# Patient Record
Sex: FEMALE | Race: WHITE | NOT HISPANIC OR LATINO | Employment: OTHER | URBAN - METROPOLITAN AREA
[De-identification: names, ages, dates, MRNs, and addresses within clinical notes are randomized per-mention and may not be internally consistent; named-entity substitution may affect disease eponyms.]

---

## 2017-04-19 ENCOUNTER — HOSPITAL ENCOUNTER (OUTPATIENT)
Dept: OTHER | Facility: HOSPITAL | Age: 47
Setting detail: SPECIMEN
Discharge: HOME | End: 2017-04-19
Attending: NURSE PRACTITIONER | Admitting: NURSE PRACTITIONER

## 2017-04-19 LAB
ALBUMIN SERPL-MCNC: 3.9 G/DL (ref 3.5–4.8)
ALBUMIN/GLOB SERPL: 1.3 {RATIO} (ref 1–1.7)
ALP SERPL-CCNC: 60 IU/L (ref 32–91)
ALT SERPL-CCNC: 19 IU/L (ref 14–54)
ANION GAP SERPL CALC-SCNC: 13.3 MMOL/L (ref 10–20)
AST SERPL-CCNC: 22 IU/L (ref 15–41)
BILIRUB SERPL-MCNC: 0.7 MG/DL (ref 0.3–1.2)
BUN SERPL-MCNC: 12 MG/DL (ref 8–20)
BUN/CREAT SERPL: 17.1 (ref 5.4–26.2)
CALCIUM SERPL-MCNC: 9.2 MG/DL (ref 8.9–10.3)
CHLORIDE SERPL-SCNC: 109 MMOL/L (ref 101–111)
CHOLEST SERPL-MCNC: 174 MG/DL
CHOLEST/HDLC SERPL: 4.1 {RATIO}
CONV CO2: 25 MMOL/L (ref 22–32)
CONV LDL CHOLESTEROL DIRECT: 112 MG/DL (ref 0–100)
CONV TOTAL PROTEIN: 6.9 G/DL (ref 6.1–7.9)
CREAT UR-MCNC: 0.7 MG/DL (ref 0.4–1)
GLOBULIN UR ELPH-MCNC: 3 G/DL (ref 2.5–3.8)
GLUCOSE SERPL-MCNC: 109 MG/DL (ref 65–99)
HDLC SERPL-MCNC: 43 MG/DL
LDLC/HDLC SERPL: 2.6 {RATIO}
LIPID INTERPRETATION: ABNORMAL
POTASSIUM SERPL-SCNC: 4.3 MMOL/L (ref 3.6–5.1)
SODIUM SERPL-SCNC: 143 MMOL/L (ref 136–144)
TRIGL SERPL-MCNC: 110 MG/DL
VLDLC SERPL CALC-MCNC: 19.7 MG/DL

## 2017-07-11 ENCOUNTER — HOSPITAL ENCOUNTER (OUTPATIENT)
Dept: OTHER | Facility: HOSPITAL | Age: 47
Setting detail: SPECIMEN
Discharge: HOME | End: 2017-07-11
Attending: NURSE PRACTITIONER | Admitting: NURSE PRACTITIONER

## 2017-07-11 ENCOUNTER — HOSPITAL ENCOUNTER (OUTPATIENT)
Dept: CT IMAGING | Facility: HOSPITAL | Age: 47
Discharge: HOME | End: 2017-07-11
Attending: NURSE PRACTITIONER | Admitting: NURSE PRACTITIONER

## 2017-07-11 LAB
BACTERIA SPEC AEROBE CULT: NORMAL
Lab: NORMAL
MICRO REPORT STATUS: NORMAL
SPECIMEN SOURCE: NORMAL

## 2018-04-23 ENCOUNTER — HOSPITAL ENCOUNTER (OUTPATIENT)
Dept: OTHER | Facility: HOSPITAL | Age: 48
Setting detail: SPECIMEN
Discharge: HOME | End: 2018-04-23
Attending: NURSE PRACTITIONER | Admitting: NURSE PRACTITIONER

## 2018-04-23 LAB
ALBUMIN SERPL-MCNC: 3.8 G/DL (ref 3.5–4.8)
ALBUMIN/GLOB SERPL: 1.2 {RATIO} (ref 1–1.7)
ALP SERPL-CCNC: 79 IU/L (ref 32–91)
ALT SERPL-CCNC: 15 IU/L (ref 14–54)
ANION GAP SERPL CALC-SCNC: 12.3 MMOL/L (ref 10–20)
AST SERPL-CCNC: 18 IU/L (ref 15–41)
BACTERIA SPEC AEROBE CULT: NORMAL
BASOPHILS # BLD AUTO: 0.1 10*3/UL (ref 0–0.2)
BASOPHILS NFR BLD AUTO: 1 % (ref 0–2)
BILIRUB SERPL-MCNC: 0.6 MG/DL (ref 0.3–1.2)
BUN SERPL-MCNC: 8 MG/DL (ref 8–20)
BUN/CREAT SERPL: 11.4 (ref 5.4–26.2)
CALCIUM SERPL-MCNC: 9.2 MG/DL (ref 8.9–10.3)
CHLORIDE SERPL-SCNC: 107 MMOL/L (ref 101–111)
CHOLEST SERPL-MCNC: 206 MG/DL
CHOLEST/HDLC SERPL: 4.1 {RATIO}
CONV CO2: 25 MMOL/L (ref 22–32)
CONV LDL CHOLESTEROL DIRECT: 128 MG/DL (ref 0–100)
CONV TOTAL PROTEIN: 7 G/DL (ref 6.1–7.9)
CREAT UR-MCNC: 0.7 MG/DL (ref 0.4–1)
DIFFERENTIAL METHOD BLD: (no result)
EOSINOPHIL # BLD AUTO: 0.2 10*3/UL (ref 0–0.3)
EOSINOPHIL # BLD AUTO: 3 % (ref 0–3)
ERYTHROCYTE [DISTWIDTH] IN BLOOD BY AUTOMATED COUNT: 13.5 % (ref 11.5–14.5)
GLOBULIN UR ELPH-MCNC: 3.2 G/DL (ref 2.5–3.8)
GLUCOSE SERPL-MCNC: 110 MG/DL (ref 65–99)
HCT VFR BLD AUTO: 43.6 % (ref 35–49)
HDLC SERPL-MCNC: 51 MG/DL
HGB BLD-MCNC: 15.3 G/DL (ref 12–15)
LDLC/HDLC SERPL: 2.5 {RATIO}
LIPID INTERPRETATION: ABNORMAL
LYMPHOCYTES # BLD AUTO: 2.5 10*3/UL (ref 0.8–4.8)
LYMPHOCYTES NFR BLD AUTO: 31 % (ref 18–42)
Lab: NORMAL
MCH RBC QN AUTO: 32.5 PG (ref 26–32)
MCHC RBC AUTO-ENTMCNC: 35 G/DL (ref 32–36)
MCV RBC AUTO: 92.9 FL (ref 80–94)
MICRO REPORT STATUS: NORMAL
MONOCYTES # BLD AUTO: 0.4 10*3/UL (ref 0.1–1.3)
MONOCYTES NFR BLD AUTO: 5 % (ref 2–11)
NEUTROPHILS # BLD AUTO: 4.8 10*3/UL (ref 2.3–8.6)
NEUTROPHILS NFR BLD AUTO: 60 % (ref 50–75)
NRBC BLD AUTO-RTO: 0 /100{WBCS}
NRBC/RBC NFR BLD MANUAL: 0 10*3/UL
PLATELET # BLD AUTO: 234 10*3/UL (ref 150–450)
PMV BLD AUTO: 9.9 FL (ref 7.4–10.4)
POTASSIUM SERPL-SCNC: 4.3 MMOL/L (ref 3.6–5.1)
RBC # BLD AUTO: 4.69 10*6/UL (ref 4–5.4)
SODIUM SERPL-SCNC: 140 MMOL/L (ref 136–144)
SPECIMEN SOURCE: NORMAL
TRIGL SERPL-MCNC: 140 MG/DL
VLDLC SERPL CALC-MCNC: 27.2 MG/DL
WBC # BLD AUTO: 8 10*3/UL (ref 4.5–11.5)

## 2018-11-13 ENCOUNTER — HOSPITAL ENCOUNTER (OUTPATIENT)
Dept: OTHER | Facility: HOSPITAL | Age: 48
Setting detail: SPECIMEN
Discharge: HOME | End: 2018-11-13
Attending: NURSE PRACTITIONER | Admitting: NURSE PRACTITIONER

## 2018-11-13 LAB
BACTERIA SPEC AEROBE CULT: NORMAL
Lab: NORMAL
MICRO REPORT STATUS: NORMAL
SPECIMEN SOURCE: NORMAL

## 2019-02-06 ENCOUNTER — HOSPITAL ENCOUNTER (OUTPATIENT)
Dept: OTHER | Facility: HOSPITAL | Age: 49
Setting detail: SPECIMEN
Discharge: HOME | End: 2019-02-06
Attending: NURSE PRACTITIONER | Admitting: NURSE PRACTITIONER

## 2019-02-06 LAB
ALBUMIN SERPL-MCNC: 4 G/DL (ref 3.5–4.8)
ALBUMIN/GLOB SERPL: 1.2 {RATIO} (ref 1–1.7)
ALP SERPL-CCNC: 85 IU/L (ref 32–91)
ALT SERPL-CCNC: 20 IU/L (ref 14–54)
ANION GAP SERPL CALC-SCNC: 13.9 MMOL/L (ref 10–20)
AST SERPL-CCNC: 24 IU/L (ref 15–41)
BASOPHILS # BLD AUTO: 0.1 10*3/UL (ref 0–0.2)
BASOPHILS NFR BLD AUTO: 1 % (ref 0–2)
BILIRUB SERPL-MCNC: 0.3 MG/DL (ref 0.3–1.2)
BUN SERPL-MCNC: 13 MG/DL (ref 8–20)
BUN/CREAT SERPL: 16.3 (ref 5.4–26.2)
CALCIUM SERPL-MCNC: 9.3 MG/DL (ref 8.9–10.3)
CHLORIDE SERPL-SCNC: 107 MMOL/L (ref 101–111)
CHOLEST SERPL-MCNC: 225 MG/DL
CHOLEST/HDLC SERPL: 4 {RATIO}
CONV CO2: 23 MMOL/L (ref 22–32)
CONV LDL CHOLESTEROL DIRECT: 158 MG/DL (ref 0–100)
CONV TOTAL PROTEIN: 7.4 G/DL (ref 6.1–7.9)
CREAT UR-MCNC: 0.8 MG/DL (ref 0.4–1)
DIFFERENTIAL METHOD BLD: (no result)
EOSINOPHIL # BLD AUTO: 0.3 10*3/UL (ref 0–0.3)
EOSINOPHIL # BLD AUTO: 4 % (ref 0–3)
ERYTHROCYTE [DISTWIDTH] IN BLOOD BY AUTOMATED COUNT: 13.6 % (ref 11.5–14.5)
GLOBULIN UR ELPH-MCNC: 3.4 G/DL (ref 2.5–3.8)
GLUCOSE SERPL-MCNC: 113 MG/DL (ref 65–99)
HCT VFR BLD AUTO: 46.1 % (ref 35–49)
HDLC SERPL-MCNC: 56 MG/DL
HGB BLD-MCNC: 15.9 G/DL (ref 12–15)
LDLC/HDLC SERPL: 2.8 {RATIO}
LIPID INTERPRETATION: ABNORMAL
LYMPHOCYTES # BLD AUTO: 2.8 10*3/UL (ref 0.8–4.8)
LYMPHOCYTES NFR BLD AUTO: 37 % (ref 18–42)
MCH RBC QN AUTO: 32.9 PG (ref 26–32)
MCHC RBC AUTO-ENTMCNC: 34.6 G/DL (ref 32–36)
MCV RBC AUTO: 95.2 FL (ref 80–94)
MONOCYTES # BLD AUTO: 0.5 10*3/UL (ref 0.1–1.3)
MONOCYTES NFR BLD AUTO: 6 % (ref 2–11)
NEUTROPHILS # BLD AUTO: 3.9 10*3/UL (ref 2.3–8.6)
NEUTROPHILS NFR BLD AUTO: 52 % (ref 50–75)
NRBC BLD AUTO-RTO: 0 /100{WBCS}
NRBC/RBC NFR BLD MANUAL: 0 10*3/UL
PLATELET # BLD AUTO: 289 10*3/UL (ref 150–450)
PMV BLD AUTO: 10 FL (ref 7.4–10.4)
POTASSIUM SERPL-SCNC: 3.9 MMOL/L (ref 3.6–5.1)
RBC # BLD AUTO: 4.84 10*6/UL (ref 4–5.4)
SODIUM SERPL-SCNC: 140 MMOL/L (ref 136–144)
TRIGL SERPL-MCNC: 109 MG/DL
VLDLC SERPL CALC-MCNC: 10.8 MG/DL
WBC # BLD AUTO: 7.5 10*3/UL (ref 4.5–11.5)

## 2019-02-13 ENCOUNTER — HOSPITAL ENCOUNTER (OUTPATIENT)
Dept: CT IMAGING | Facility: HOSPITAL | Age: 49
Discharge: HOME | End: 2019-02-13
Attending: NURSE PRACTITIONER | Admitting: NURSE PRACTITIONER

## 2019-07-22 RX ORDER — LORAZEPAM 0.5 MG/1
0.5 TABLET ORAL 2 TIMES DAILY
Qty: 60 TABLET | Refills: 0 | Status: SHIPPED | OUTPATIENT
Start: 2019-07-22 | End: 2019-09-10 | Stop reason: SDUPTHER

## 2019-07-22 RX ORDER — SERTRALINE HYDROCHLORIDE 25 MG/1
25 TABLET, FILM COATED ORAL DAILY
Qty: 90 TABLET | Refills: 0 | Status: SHIPPED | OUTPATIENT
Start: 2019-07-22 | End: 2019-09-25 | Stop reason: SDUPTHER

## 2019-09-10 RX ORDER — CHOLECALCIFEROL (VITAMIN D3) 25 MCG
1 TABLET,CHEWABLE ORAL DAILY
Qty: 90 CAPSULE | Refills: 0 | OUTPATIENT
Start: 2019-09-10

## 2019-09-10 RX ORDER — ALBUTEROL SULFATE 90 UG/1
2 AEROSOL, METERED RESPIRATORY (INHALATION) EVERY 4 HOURS PRN
Qty: 1 INHALER | Refills: 0 | Status: SHIPPED | OUTPATIENT
Start: 2019-09-10 | End: 2019-10-03 | Stop reason: SDUPTHER

## 2019-09-10 RX ORDER — LORAZEPAM 0.5 MG/1
0.5 TABLET ORAL 2 TIMES DAILY
Qty: 60 TABLET | Refills: 0 | Status: SHIPPED | OUTPATIENT
Start: 2019-09-10 | End: 2019-10-28 | Stop reason: SDUPTHER

## 2019-09-25 ENCOUNTER — OFFICE VISIT (OUTPATIENT)
Dept: FAMILY MEDICINE CLINIC | Facility: CLINIC | Age: 49
End: 2019-09-25

## 2019-09-25 VITALS
HEIGHT: 65 IN | HEART RATE: 76 BPM | DIASTOLIC BLOOD PRESSURE: 88 MMHG | TEMPERATURE: 97.9 F | WEIGHT: 212 LBS | OXYGEN SATURATION: 94 % | SYSTOLIC BLOOD PRESSURE: 131 MMHG | BODY MASS INDEX: 35.32 KG/M2

## 2019-09-25 DIAGNOSIS — E78.2 MIXED HYPERLIPIDEMIA: ICD-10-CM

## 2019-09-25 DIAGNOSIS — Z12.31 SCREENING MAMMOGRAM, ENCOUNTER FOR: ICD-10-CM

## 2019-09-25 DIAGNOSIS — D75.89 MACROCYTOSIS: ICD-10-CM

## 2019-09-25 DIAGNOSIS — R73.9 HYPERGLYCEMIA: Primary | ICD-10-CM

## 2019-09-25 DIAGNOSIS — E55.9 VITAMIN D DEFICIENCY: ICD-10-CM

## 2019-09-25 DIAGNOSIS — F41.9 ANXIETY: ICD-10-CM

## 2019-09-25 DIAGNOSIS — E03.9 HYPOTHYROIDISM, UNSPECIFIED TYPE: ICD-10-CM

## 2019-09-25 PROBLEM — R91.1 LUNG NODULE: Status: ACTIVE | Noted: 2018-01-03

## 2019-09-25 LAB
ALBUMIN SERPL-MCNC: 4.2 G/DL (ref 3.5–4.8)
ALBUMIN/GLOB SERPL: 1.5 G/DL (ref 1–1.7)
ALP SERPL-CCNC: 72 U/L (ref 32–91)
ALT SERPL W P-5'-P-CCNC: 16 U/L (ref 14–54)
ANION GAP SERPL CALCULATED.3IONS-SCNC: 15.7 MMOL/L (ref 5–15)
ARTICHOKE IGE QN: 182 MG/DL (ref 0–100)
AST SERPL-CCNC: 20 U/L (ref 15–41)
BASOPHILS # BLD AUTO: 0.1 10*3/MM3 (ref 0–0.2)
BASOPHILS NFR BLD AUTO: 1.1 % (ref 0–1.5)
BILIRUB SERPL-MCNC: 0.4 MG/DL (ref 0.3–1.2)
BUN BLD-MCNC: 16 MG/DL (ref 8–20)
BUN/CREAT SERPL: 20 (ref 5.4–26.2)
CALCIUM SPEC-SCNC: 9.7 MG/DL (ref 8.9–10.3)
CHLORIDE SERPL-SCNC: 107 MMOL/L (ref 101–111)
CHOLEST SERPL-MCNC: 236 MG/DL
CO2 SERPL-SCNC: 24 MMOL/L (ref 22–32)
CREAT BLD-MCNC: 0.8 MG/DL (ref 0.4–1)
DEPRECATED RDW RBC AUTO: 45.1 FL (ref 37–54)
EOSINOPHIL # BLD AUTO: 0.2 10*3/MM3 (ref 0–0.4)
EOSINOPHIL NFR BLD AUTO: 2.9 % (ref 0.3–6.2)
ERYTHROCYTE [DISTWIDTH] IN BLOOD BY AUTOMATED COUNT: 13.5 % (ref 12.3–15.4)
GFR SERPL CREATININE-BSD FRML MDRD: 76 ML/MIN/1.73
GLOBULIN UR ELPH-MCNC: 2.8 GM/DL (ref 2.5–3.8)
GLUCOSE BLD-MCNC: 104 MG/DL (ref 65–99)
HCT VFR BLD AUTO: 46.7 % (ref 34–46.6)
HDLC SERPL QL: 3.87
HDLC SERPL-MCNC: 61 MG/DL
HGB BLD-MCNC: 16.3 G/DL (ref 12–15.9)
LDLC/HDLC SERPL: 2.55 {RATIO}
LYMPHOCYTES # BLD AUTO: 2.6 10*3/MM3 (ref 0.7–3.1)
LYMPHOCYTES NFR BLD AUTO: 34.7 % (ref 19.6–45.3)
MCH RBC QN AUTO: 33.4 PG (ref 26.6–33)
MCHC RBC AUTO-ENTMCNC: 34.9 G/DL (ref 31.5–35.7)
MCV RBC AUTO: 95.7 FL (ref 79–97)
MONOCYTES # BLD AUTO: 0.5 10*3/MM3 (ref 0.1–0.9)
MONOCYTES NFR BLD AUTO: 6.9 % (ref 5–12)
NEUTROPHILS # BLD AUTO: 4 10*3/MM3 (ref 1.7–7)
NEUTROPHILS NFR BLD AUTO: 54.4 % (ref 42.7–76)
NRBC BLD AUTO-RTO: 0.3 /100 WBC (ref 0–0.2)
PLATELET # BLD AUTO: 275 10*3/MM3 (ref 140–450)
PMV BLD AUTO: 9.1 FL (ref 6–12)
POTASSIUM BLD-SCNC: 4.7 MMOL/L (ref 3.6–5.1)
PROT SERPL-MCNC: 7 G/DL (ref 6.1–7.9)
RBC # BLD AUTO: 4.88 10*6/MM3 (ref 3.77–5.28)
SODIUM BLD-SCNC: 142 MMOL/L (ref 136–144)
T4 FREE SERPL-MCNC: 0.74 NG/DL (ref 0.58–1.64)
TRIGL SERPL-MCNC: 98 MG/DL
TSH SERPL DL<=0.05 MIU/L-ACNC: 6.68 UIU/ML (ref 0.34–5.6)
VIT B12 BLD-MCNC: 670 PG/ML (ref 180–914)
VLDLC SERPL-MCNC: 19.6 MG/DL
WBC NRBC COR # BLD: 7.4 10*3/MM3 (ref 3.4–10.8)

## 2019-09-25 PROCEDURE — 82607 VITAMIN B-12: CPT | Performed by: NURSE PRACTITIONER

## 2019-09-25 PROCEDURE — 84443 ASSAY THYROID STIM HORMONE: CPT | Performed by: NURSE PRACTITIONER

## 2019-09-25 PROCEDURE — 84439 ASSAY OF FREE THYROXINE: CPT | Performed by: NURSE PRACTITIONER

## 2019-09-25 PROCEDURE — 80061 LIPID PANEL: CPT | Performed by: NURSE PRACTITIONER

## 2019-09-25 PROCEDURE — 83036 HEMOGLOBIN GLYCOSYLATED A1C: CPT | Performed by: NURSE PRACTITIONER

## 2019-09-25 PROCEDURE — 85025 COMPLETE CBC W/AUTO DIFF WBC: CPT | Performed by: NURSE PRACTITIONER

## 2019-09-25 PROCEDURE — 82306 VITAMIN D 25 HYDROXY: CPT | Performed by: NURSE PRACTITIONER

## 2019-09-25 PROCEDURE — 80053 COMPREHEN METABOLIC PANEL: CPT | Performed by: NURSE PRACTITIONER

## 2019-09-25 PROCEDURE — 99214 OFFICE O/P EST MOD 30 MIN: CPT | Performed by: NURSE PRACTITIONER

## 2019-09-25 RX ORDER — METHYLPREDNISOLONE 4 MG/1
TABLET ORAL
Qty: 21 TABLET | Refills: 0 | Status: SHIPPED | OUTPATIENT
Start: 2019-09-25 | End: 2020-02-27

## 2019-09-25 RX ORDER — LEVOTHYROXINE SODIUM 0.05 MG/1
25 TABLET ORAL DAILY
Qty: 90 TABLET | Refills: 0 | Status: SHIPPED | OUTPATIENT
Start: 2019-09-25 | End: 2019-09-25 | Stop reason: SDUPTHER

## 2019-09-25 RX ORDER — LEVOTHYROXINE SODIUM 0.03 MG/1
25 TABLET ORAL DAILY
Qty: 90 TABLET | Refills: 0 | Status: SHIPPED | OUTPATIENT
Start: 2019-09-25 | End: 2020-02-06

## 2019-09-25 NOTE — PROGRESS NOTES
Subjective   Romina Camarillo is a 48 y.o. female.     Patient presents today for follow up on anxiety, hyperlipidemia, and hypothyroidism. She notes cough and congestion this week.          The following portions of the patient's history were reviewed and updated as appropriate: allergies, current medications, past family history, past medical history, past social history, past surgical history and problem list.    Review of Systems   Constitutional: Negative for activity change, chills, diaphoresis, fatigue and fever.   HENT: Positive for rhinorrhea. Negative for congestion, ear pain, facial swelling, mouth sores, sinus pressure and sore throat.    Eyes: Negative for blurred vision, double vision, photophobia, pain and visual disturbance.   Respiratory: Positive for cough and chest tightness. Negative for choking, shortness of breath, wheezing and stridor.    Cardiovascular: Negative for chest pain, palpitations and leg swelling.   Gastrointestinal: Negative for abdominal distention, abdominal pain, anal bleeding, blood in stool, constipation, diarrhea, nausea, rectal pain, vomiting, GERD and indigestion.   Endocrine: Negative for polydipsia, polyphagia and polyuria.   Genitourinary: Negative for difficulty urinating, frequency, hematuria, urgency and urinary incontinence.   Musculoskeletal: Negative for arthralgias, back pain and neck pain.   Skin: Negative for rash and skin lesions.   Neurological: Negative for dizziness, tremors, weakness, light-headedness and headache.   Psychiatric/Behavioral: Negative for agitation, behavioral problems, self-injury, sleep disturbance, suicidal ideas, depressed mood and stress. The patient is nervous/anxious.        Objective   Physical Exam   Constitutional: She is oriented to person, place, and time. She appears well-developed and well-nourished. No distress.   HENT:   Head: Normocephalic and atraumatic.   Right Ear: External ear normal.   Left Ear: External ear normal.    Nose: Nose normal.   Mouth/Throat: Oropharynx is clear and moist. No oropharyngeal exudate.   Eyes: Conjunctivae and EOM are normal. Pupils are equal, round, and reactive to light. Right eye exhibits no discharge. Left eye exhibits no discharge. No scleral icterus.   Neck: Normal range of motion. Neck supple. No JVD present. Carotid bruit is not present. No tracheal deviation present. No thyromegaly present.   Cardiovascular: Normal rate, regular rhythm, normal heart sounds and intact distal pulses. Exam reveals no gallop and no friction rub.   No murmur heard.  Pulmonary/Chest: No stridor. No respiratory distress. She has wheezes. She has no rales. She exhibits no tenderness.   Abdominal: Soft. Bowel sounds are normal. She exhibits no distension. There is no tenderness.   Musculoskeletal: Normal range of motion. She exhibits no edema, tenderness or deformity.   Lymphadenopathy:     She has no cervical adenopathy.   Neurological: She is alert and oriented to person, place, and time. No sensory deficit. She exhibits normal muscle tone. Coordination normal.   Skin: Skin is warm and dry. Capillary refill takes less than 2 seconds. No rash noted. She is not diaphoretic.   Psychiatric: She has a normal mood and affect. Her behavior is normal. Judgment and thought content normal.         Assessment/Plan   Romina was seen today for cough, nasal congestion and follow-up.    Diagnoses and all orders for this visit:    Hyperglycemia  -     Comprehensive Metabolic Panel  -     Hemoglobin A1c    Macrocytosis  -     CBC & Differential  -     Vitamin B12  -     CBC Auto Differential    Vitamin D deficiency  -     Vitamin D 25 Hydroxy    Mixed hyperlipidemia  -     Lipid Panel    Hypothyroidism, unspecified type  -     TSH  -     T4, Free    Screening mammogram, encounter for  -     Mammo Screening Digital Tomosynthesis Bilateral With CAD    Anxiety  -     Ambulatory Referral to Psychiatry  -     sertraline (ZOLOFT) 50 MG  tablet; Take 1 tablet by mouth Daily.  - Increase Zoloft to 50 mg as patient has continued anxiety    Wheezing  -     methylPREDNISolone (MEDROL, HAZEL,) 4 MG tablet; Take as directed on package instructions.  - Sned medrol pack, follow up if no improvement. Continue rescue inhaler    Preventative Health Care   -  Order mammogram today   -  Order cologuard. Patient declines colonoscopy   -  Followed by OBGYN   -  Declines flu shot

## 2019-09-26 LAB
25(OH)D3 SERPL-MCNC: 24.8 NG/ML (ref 30–100)
HBA1C MFR BLD: 5.3 % (ref 3.5–5.6)

## 2019-09-27 RX ORDER — ERGOCALCIFEROL 1.25 MG/1
50000 CAPSULE ORAL WEEKLY
Qty: 5 CAPSULE | Refills: 2 | Status: SHIPPED | OUTPATIENT
Start: 2019-09-27 | End: 2020-02-27 | Stop reason: SDUPTHER

## 2019-09-27 NOTE — PROGRESS NOTES
Patient notified. She is not taking her thyroid or cholesterol meds at all. She says she will start. She Is out of Vit D could you send refill to her pharmacy?

## 2019-09-30 RX ORDER — CHOLECALCIFEROL (VITAMIN D3) 25 MCG
1 TABLET,CHEWABLE ORAL DAILY
Qty: 90 CAPSULE | Refills: 0 | Status: SHIPPED | OUTPATIENT
Start: 2019-09-30 | End: 2021-03-24 | Stop reason: SDUPTHER

## 2019-10-04 RX ORDER — ALBUTEROL SULFATE 90 UG/1
AEROSOL, METERED RESPIRATORY (INHALATION)
Qty: 18 G | Refills: 0 | Status: SHIPPED | OUTPATIENT
Start: 2019-10-04 | End: 2019-11-22 | Stop reason: SDUPTHER

## 2019-10-29 RX ORDER — LORAZEPAM 0.5 MG/1
TABLET ORAL
Qty: 60 TABLET | Refills: 0 | Status: SHIPPED | OUTPATIENT
Start: 2019-10-29 | End: 2019-12-30 | Stop reason: SDUPTHER

## 2019-11-22 RX ORDER — ALBUTEROL SULFATE 90 UG/1
AEROSOL, METERED RESPIRATORY (INHALATION)
Qty: 18 G | Refills: 0 | Status: SHIPPED | OUTPATIENT
Start: 2019-11-22 | End: 2019-12-27

## 2019-12-27 RX ORDER — ALBUTEROL SULFATE 90 UG/1
AEROSOL, METERED RESPIRATORY (INHALATION)
Qty: 18 G | Refills: 0 | Status: SHIPPED | OUTPATIENT
Start: 2019-12-27 | End: 2020-02-06

## 2019-12-30 DIAGNOSIS — F41.9 ANXIETY: Primary | ICD-10-CM

## 2019-12-31 RX ORDER — LORAZEPAM 0.5 MG/1
0.5 TABLET ORAL 2 TIMES DAILY
Qty: 60 TABLET | Refills: 0 | Status: SHIPPED | OUTPATIENT
Start: 2019-12-31 | End: 2020-02-06

## 2020-02-06 DIAGNOSIS — F41.9 ANXIETY: ICD-10-CM

## 2020-02-06 RX ORDER — LEVOTHYROXINE SODIUM 0.03 MG/1
TABLET ORAL
Qty: 30 TABLET | Refills: 0 | Status: SHIPPED | OUTPATIENT
Start: 2020-02-06 | End: 2020-02-27 | Stop reason: SDUPTHER

## 2020-02-06 RX ORDER — ALBUTEROL SULFATE 90 UG/1
AEROSOL, METERED RESPIRATORY (INHALATION)
Qty: 18 G | Refills: 0 | Status: SHIPPED | OUTPATIENT
Start: 2020-02-06 | End: 2020-03-06

## 2020-02-06 RX ORDER — LORAZEPAM 0.5 MG/1
TABLET ORAL
Qty: 60 TABLET | Refills: 0 | Status: SHIPPED | OUTPATIENT
Start: 2020-02-06 | End: 2020-03-24

## 2020-02-27 ENCOUNTER — OFFICE VISIT (OUTPATIENT)
Dept: FAMILY MEDICINE CLINIC | Facility: CLINIC | Age: 50
End: 2020-02-27

## 2020-02-27 VITALS
HEIGHT: 65 IN | DIASTOLIC BLOOD PRESSURE: 82 MMHG | BODY MASS INDEX: 36.22 KG/M2 | WEIGHT: 217.4 LBS | OXYGEN SATURATION: 99 % | SYSTOLIC BLOOD PRESSURE: 118 MMHG | HEART RATE: 61 BPM

## 2020-02-27 DIAGNOSIS — Z72.0 TOBACCO ABUSE: ICD-10-CM

## 2020-02-27 DIAGNOSIS — Z71.6 TOBACCO ABUSE COUNSELING: ICD-10-CM

## 2020-02-27 DIAGNOSIS — I10 ESSENTIAL HYPERTENSION: ICD-10-CM

## 2020-02-27 DIAGNOSIS — R91.1 LUNG NODULE: ICD-10-CM

## 2020-02-27 DIAGNOSIS — F41.9 ANXIETY: ICD-10-CM

## 2020-02-27 DIAGNOSIS — E03.9 ACQUIRED HYPOTHYROIDISM: Primary | ICD-10-CM

## 2020-02-27 PROCEDURE — 99214 OFFICE O/P EST MOD 30 MIN: CPT | Performed by: NURSE PRACTITIONER

## 2020-02-27 RX ORDER — ERGOCALCIFEROL 1.25 MG/1
50000 CAPSULE ORAL WEEKLY
Qty: 12 CAPSULE | Refills: 3 | Status: SHIPPED | OUTPATIENT
Start: 2020-02-27 | End: 2021-02-05 | Stop reason: SDUPTHER

## 2020-02-27 RX ORDER — LEVOTHYROXINE SODIUM 0.03 MG/1
25 TABLET ORAL DAILY
Qty: 90 TABLET | Refills: 0 | Status: SHIPPED | OUTPATIENT
Start: 2020-02-27 | End: 2020-05-13 | Stop reason: SDUPTHER

## 2020-02-27 RX ORDER — METHYLPREDNISOLONE 4 MG/1
TABLET ORAL
Qty: 21 TABLET | Refills: 0 | Status: SHIPPED | OUTPATIENT
Start: 2020-02-27 | End: 2020-05-12

## 2020-02-27 RX ORDER — AZITHROMYCIN 250 MG/1
TABLET, FILM COATED ORAL
Qty: 6 TABLET | Refills: 0 | Status: SHIPPED | OUTPATIENT
Start: 2020-02-27 | End: 2020-05-12

## 2020-03-05 ENCOUNTER — TELEPHONE (OUTPATIENT)
Dept: FAMILY MEDICINE CLINIC | Facility: CLINIC | Age: 50
End: 2020-03-05

## 2020-03-05 RX ORDER — AMOXICILLIN 875 MG/1
875 TABLET, COATED ORAL 2 TIMES DAILY
Qty: 20 TABLET | Refills: 0 | Status: SHIPPED | OUTPATIENT
Start: 2020-03-05 | End: 2020-03-15

## 2020-03-05 NOTE — TELEPHONE ENCOUNTER
I sent her in amoxicillin, recommend her to get Mucinex DM over-the-counter as well and start using Flonase to both nostrils twice daily.    Also from my last note: Can you please get ct chest from old chart, 2 lung nodules.  I will order ct chest when due    thank you

## 2020-03-05 NOTE — TELEPHONE ENCOUNTER
Pt said she has taken her meds and still ear hurts and is clogged didn't know if you wanted to prescribed more meds or what you recommended. Thanks obi

## 2020-03-06 DIAGNOSIS — R91.1 LEFT LOWER LOBE PULMONARY NODULE: Primary | ICD-10-CM

## 2020-03-06 RX ORDER — ALBUTEROL SULFATE 90 UG/1
AEROSOL, METERED RESPIRATORY (INHALATION)
Qty: 18 G | Refills: 0 | Status: SHIPPED | OUTPATIENT
Start: 2020-03-06 | End: 2020-04-23

## 2020-03-24 DIAGNOSIS — F41.9 ANXIETY: ICD-10-CM

## 2020-03-24 RX ORDER — LORAZEPAM 0.5 MG/1
TABLET ORAL
Qty: 60 TABLET | Refills: 0 | Status: SHIPPED | OUTPATIENT
Start: 2020-03-24 | End: 2020-04-23

## 2020-04-22 DIAGNOSIS — F41.9 ANXIETY: ICD-10-CM

## 2020-04-23 RX ORDER — ALBUTEROL SULFATE 90 UG/1
AEROSOL, METERED RESPIRATORY (INHALATION)
Qty: 18 G | Refills: 0 | Status: SHIPPED | OUTPATIENT
Start: 2020-04-23 | End: 2020-06-09

## 2020-04-23 RX ORDER — LORAZEPAM 0.5 MG/1
TABLET ORAL
Qty: 60 TABLET | Refills: 0 | Status: SHIPPED | OUTPATIENT
Start: 2020-04-23 | End: 2020-06-09

## 2020-04-27 ENCOUNTER — TELEPHONE (OUTPATIENT)
Dept: FAMILY MEDICINE CLINIC | Facility: CLINIC | Age: 50
End: 2020-04-27

## 2020-04-27 NOTE — TELEPHONE ENCOUNTER
Pt called and left vm regarding some mediation that Kyung provided for her and if she should be taking it right now.  Has Ear pain and sinus. Can you please call her. Thanks

## 2020-04-30 NOTE — TELEPHONE ENCOUNTER
Called patient and she is feeling better.  She still has some congestion but her nebulizer is helping it.

## 2020-05-13 ENCOUNTER — TELEMEDICINE (OUTPATIENT)
Dept: FAMILY MEDICINE CLINIC | Facility: CLINIC | Age: 50
End: 2020-05-13

## 2020-05-13 DIAGNOSIS — Z12.31 BREAST CANCER SCREENING BY MAMMOGRAM: ICD-10-CM

## 2020-05-13 DIAGNOSIS — I10 ESSENTIAL HYPERTENSION: ICD-10-CM

## 2020-05-13 DIAGNOSIS — F41.9 ANXIETY: ICD-10-CM

## 2020-05-13 DIAGNOSIS — R91.1 LUNG NODULE: ICD-10-CM

## 2020-05-13 DIAGNOSIS — E53.8 B12 DEFICIENCY: ICD-10-CM

## 2020-05-13 DIAGNOSIS — H65.111 NON-RECURRENT ACUTE ALLERGIC OTITIS MEDIA OF RIGHT EAR: ICD-10-CM

## 2020-05-13 DIAGNOSIS — E55.9 VITAMIN D DEFICIENCY: ICD-10-CM

## 2020-05-13 DIAGNOSIS — E03.9 HYPOTHYROIDISM, UNSPECIFIED TYPE: Primary | ICD-10-CM

## 2020-05-13 DIAGNOSIS — J44.9 CHRONIC OBSTRUCTIVE PULMONARY DISEASE, UNSPECIFIED COPD TYPE (HCC): ICD-10-CM

## 2020-05-13 PROCEDURE — 99443 PR PHYS/QHP TELEPHONE EVALUATION 21-30 MIN: CPT | Performed by: NURSE PRACTITIONER

## 2020-05-13 RX ORDER — AMOXICILLIN 875 MG/1
875 TABLET, COATED ORAL 2 TIMES DAILY
Qty: 20 TABLET | Refills: 0 | Status: SHIPPED | OUTPATIENT
Start: 2020-05-13 | End: 2020-05-23

## 2020-05-13 RX ORDER — LEVOTHYROXINE SODIUM 0.03 MG/1
25 TABLET ORAL DAILY
Qty: 90 TABLET | Refills: 1 | Status: SHIPPED | OUTPATIENT
Start: 2020-05-13 | End: 2020-10-07 | Stop reason: SDUPTHER

## 2020-05-13 NOTE — PROGRESS NOTES
Chief Complaint   Patient presents with   • Follow-up     HTN, hypothyroid and lung nodules       You have chosen to receive care through a telephone visit. Do you consent to use a telephone visit for your medical care today? Yes      HPI     Ear pain, lasting 2-3 days now, sinus pressure present 1 week w/ clear postnasal drainage, sore throat, cough, possible broken tooth, no fever. Not currently on allergy medication.     HTN, stable on meds and takes as directed, denies chest pain, headache, shortness of air, palpitations and swelling of extremities.      Hypothyroidism, last TSH 6.6, 9/2019. Feels stable on medication, fatigue and energy level improving now taking medication daily, denies symptoms of constipation, weight gain/loss, hot or cold intolerance, hair loss, abnormal heart rate.     COPD, CT chest with calcified granuloma right lower lobe, 2 nodules in the left lower lobe, with a few enlarged hilar and mediastinal lymph nodes, Calcified lymph nodes in the mediastinum: old healed granulomatous disease, 6mo f/u ct scan ordered but on hold d/t covid19. Symptoms include dyspnea, non-productive cough and wheezing, especially with exertion,  stable since onset.  She is using albuterol inhaler prn as directed.     Anxiety, patient denies significant weight loss/gain, insomnia, hypersomnia, psychomotor agitation, psychomotor retardation, fatigue (loss of energy), feelings of worthlessness (guilt), impaired concentration (indecisiveness), thoughts of death or suicide. Takes zoloft daily and ativan nightly prn.           Past Medical History:   Diagnosis Date   • Anxiety 04/19/2017   • Depression    • Fatigue 02/06/2019   • Lung nodule 01/03/2018   • Obesity 04/19/2017   • Preventative health care 04/19/2017   • Upper respiratory infection 02/06/2019   • Visit for screening mammogram 02/06/2019     No past surgical history on file.  No family history on file.  Social History     Tobacco Use   • Smoking status:  Current Every Day Smoker   Substance Use Topics   • Alcohol use: No     Frequency: Never         Current Outpatient Medications:   •  ALBUTEROL SULFATE  (90 Base) MCG/ACT inhaler, INHALE 2 PUFFS BY MOUTH AND INTO THE LUNGS EVERY 4 HOURS IF NEEDED FOR WHEEZING, Disp: 18 g, Rfl: 0  •  cetirizine (zyrTEC) 10 MG tablet, Take 10 mg by mouth every night at bedtime., Disp: , Rfl:   •  Cyanocobalamin (B-12) 1000 MCG capsule, Take 1 capsule by mouth Daily., Disp: 90 capsule, Rfl: 0  •  levothyroxine (SYNTHROID, LEVOTHROID) 25 MCG tablet, Take 1 tablet by mouth Daily., Disp: 90 tablet, Rfl: 1  •  LORazepam (ATIVAN) 0.5 MG tablet, TAKE 1 TABLET BY MOUTH TWICE DAILY AS NEEDED, Disp: 60 tablet, Rfl: 0  •  sertraline (ZOLOFT) 50 MG tablet, Take 1 tablet by mouth Daily., Disp: 90 tablet, Rfl: 1  •  vitamin D (ERGOCALCIFEROL) 1.25 MG (38353 UT) capsule capsule, Take 1 capsule by mouth 1 (One) Time Per Week., Disp: 12 capsule, Rfl: 3  •  amoxicillin (AMOXIL) 875 MG tablet, Take 1 tablet by mouth 2 (Two) Times a Day for 10 days., Disp: 20 tablet, Rfl: 0      The following portions of the patient's history were reviewed and updated as appropriate: allergies, current medications, past family history, past medical history, past social history, past surgical history and problem list.    Review of Systems     A full 12 point review of systems has been obtained as mentioned in HPI, otherwise negative      There were no vitals filed for this visit.  There is no height or weight on file to calculate BMI.    Physical Exam   Constitutional: She is oriented to person, place, and time. No distress.   Exam otherwise deferred through video or audio visit   Pulmonary/Chest: Effort normal. No respiratory distress.   Neurological: She is alert and oriented to person, place, and time.   Psychiatric: She has a normal mood and affect. Her behavior is normal. Judgment and thought content normal.       No visits with results within 7 Day(s) from  this visit.   Latest known visit with results is:   Office Visit on 09/25/2019   Component Date Value Ref Range Status   • Glucose 09/25/2019 104* 65 - 99 mg/dL Final   • BUN 09/25/2019 16  8 - 20 mg/dL Final   • Creatinine 09/25/2019 0.80  0.40 - 1.00 mg/dL Final   • Sodium 09/25/2019 142  136 - 144 mmol/L Final   • Potassium 09/25/2019 4.7  3.6 - 5.1 mmol/L Final   • Chloride 09/25/2019 107  101 - 111 mmol/L Final   • CO2 09/25/2019 24.0  22.0 - 32.0 mmol/L Final   • Calcium 09/25/2019 9.7  8.9 - 10.3 mg/dL Final   • Total Protein 09/25/2019 7.0  6.1 - 7.9 g/dL Final   • Albumin 09/25/2019 4.20  3.50 - 4.80 g/dL Final   • ALT (SGPT) 09/25/2019 16  14 - 54 U/L Final   • AST (SGOT) 09/25/2019 20  15 - 41 U/L Final   • Alkaline Phosphatase 09/25/2019 72  32 - 91 U/L Final   • Total Bilirubin 09/25/2019 0.4  0.3 - 1.2 mg/dL Final   • eGFR Non African Amer 09/25/2019 76  >60 mL/min/1.73 Final   • Globulin 09/25/2019 2.8  2.5 - 3.8 gm/dL Final   • A/G Ratio 09/25/2019 1.5  1.0 - 1.7 g/dL Final   • BUN/Creatinine Ratio 09/25/2019 20.0  5.4 - 26.2 Final   • Anion Gap 09/25/2019 15.7* 5.0 - 15.0 mmol/L Final   • Total Cholesterol 09/25/2019 236* <=200 mg/dL Final   • Triglycerides 09/25/2019 98  <=150 mg/dL Final   • HDL Cholesterol 09/25/2019 61  >=39 mg/dL Final   • LDL Cholesterol  09/25/2019 182* 0 - 100 mg/dL Final   • VLDL Cholesterol 09/25/2019 19.6  mg/dL Final   • LDL/HDL Ratio 09/25/2019 2.55   Final   • Chol/HDL Ratio 09/25/2019 3.87   Final   • TSH 09/25/2019 6.680* 0.340 - 5.600 uIU/mL Final    Results may be falsely decreased if patient taking Biotin.   • Free T4 09/25/2019 0.74  0.58 - 1.64 ng/dL Final    Results may be falsely increased if patient taking Biotin.   • 25 Hydroxy, Vitamin D 09/25/2019 24.8* 30.0 - 100.0 ng/ml Final   • Vitamin B-12 09/25/2019 670  180 - 914 pg/mL Final    Results may be falsely increased if patient taking Biotin.   • Hemoglobin A1C 09/25/2019 5.3  3.5 - 5.6 % Final   • WBC  09/25/2019 7.40  3.40 - 10.80 10*3/mm3 Final   • RBC 09/25/2019 4.88  3.77 - 5.28 10*6/mm3 Final   • Hemoglobin 09/25/2019 16.3* 12.0 - 15.9 g/dL Final   • Hematocrit 09/25/2019 46.7* 34.0 - 46.6 % Final   • MCV 09/25/2019 95.7  79.0 - 97.0 fL Final   • MCH 09/25/2019 33.4* 26.6 - 33.0 pg Final   • MCHC 09/25/2019 34.9  31.5 - 35.7 g/dL Final   • RDW 09/25/2019 13.5  12.3 - 15.4 % Final   • RDW-SD 09/25/2019 45.1  37.0 - 54.0 fl Final   • MPV 09/25/2019 9.1  6.0 - 12.0 fL Final   • Platelets 09/25/2019 275  140 - 450 10*3/mm3 Final   • Neutrophil % 09/25/2019 54.4  42.7 - 76.0 % Final   • Lymphocyte % 09/25/2019 34.7  19.6 - 45.3 % Final   • Monocyte % 09/25/2019 6.9  5.0 - 12.0 % Final   • Eosinophil % 09/25/2019 2.9  0.3 - 6.2 % Final   • Basophil % 09/25/2019 1.1  0.0 - 1.5 % Final   • Neutrophils, Absolute 09/25/2019 4.00  1.70 - 7.00 10*3/mm3 Final   • Lymphocytes, Absolute 09/25/2019 2.60  0.70 - 3.10 10*3/mm3 Final   • Monocytes, Absolute 09/25/2019 0.50  0.10 - 0.90 10*3/mm3 Final   • Eosinophils, Absolute 09/25/2019 0.20  0.00 - 0.40 10*3/mm3 Final   • Basophils, Absolute 09/25/2019 0.10  0.00 - 0.20 10*3/mm3 Final   • nRBC 09/25/2019 0.3* 0.0 - 0.2 /100 WBC Final       Diagnoses and all orders for this visit:    1. Hypothyroidism, unspecified type (Primary)  -     TSH; Future    2. Essential hypertension  -     Lipid Panel; Future  -     Comprehensive Metabolic Panel; Future  -     CBC (No Diff); Future    3. Vitamin D deficiency  -     Vitamin D 25 Hydroxy; Future    4. Breast cancer screening by mammogram  -     Mammo Screening Digital Tomosynthesis Bilateral With CAD; Future    5. B12 deficiency  -     Vitamin B12; Future    6. Anxiety  -     sertraline (ZOLOFT) 50 MG tablet; Take 1 tablet by mouth Daily.  Dispense: 90 tablet; Refill: 1    7. Non-recurrent acute allergic otitis media of right ear    8. Chronic obstructive pulmonary disease, unspecified COPD type (CMS/HCC)    9. Lung nodule    Other  orders  -     amoxicillin (AMOXIL) 875 MG tablet; Take 1 tablet by mouth 2 (Two) Times a Day for 10 days.  Dispense: 20 tablet; Refill: 0  -     levothyroxine (SYNTHROID, LEVOTHROID) 25 MCG tablet; Take 1 tablet by mouth Daily.  Dispense: 90 tablet; Refill: 1      bp stable, not currently on medication, likely intermittently elev r/t anxiety  Start amox, recommend restart zyrtec.   Now taking levothyrox consistently, check labs again in 3mo prior to appt.   reschedule mammo, not done 2019  Repeat ct chest when elective cases being scheduled.   Consider maintenance inhaler if increasing use of albuterol    rtc in 3mo or earlier if needed      This was an audio and video enabled telemedicine encounter.  Total time of discussion was 25 minutes

## 2020-06-09 DIAGNOSIS — F41.9 ANXIETY: ICD-10-CM

## 2020-06-09 RX ORDER — ALBUTEROL SULFATE 90 UG/1
AEROSOL, METERED RESPIRATORY (INHALATION)
Qty: 18 G | Refills: 0 | Status: SHIPPED | OUTPATIENT
Start: 2020-06-09 | End: 2020-09-16

## 2020-06-10 RX ORDER — LORAZEPAM 0.5 MG/1
TABLET ORAL
Qty: 60 TABLET | Refills: 1 | Status: SHIPPED | OUTPATIENT
Start: 2020-06-10 | End: 2020-09-16

## 2020-09-15 DIAGNOSIS — F41.9 ANXIETY: ICD-10-CM

## 2020-09-16 RX ORDER — LORAZEPAM 0.5 MG/1
0.5 TABLET ORAL NIGHTLY PRN
Qty: 30 TABLET | Refills: 0 | Status: SHIPPED | OUTPATIENT
Start: 2020-09-16 | End: 2020-10-07 | Stop reason: SDUPTHER

## 2020-09-16 RX ORDER — ALBUTEROL SULFATE 90 UG/1
AEROSOL, METERED RESPIRATORY (INHALATION)
Qty: 18 G | Refills: 0 | Status: SHIPPED | OUTPATIENT
Start: 2020-09-16 | End: 2020-10-07

## 2020-10-07 ENCOUNTER — OFFICE VISIT (OUTPATIENT)
Dept: FAMILY MEDICINE CLINIC | Facility: CLINIC | Age: 50
End: 2020-10-07

## 2020-10-07 VITALS
BODY MASS INDEX: 36.06 KG/M2 | SYSTOLIC BLOOD PRESSURE: 123 MMHG | HEIGHT: 65 IN | OXYGEN SATURATION: 98 % | TEMPERATURE: 96.9 F | DIASTOLIC BLOOD PRESSURE: 81 MMHG | WEIGHT: 216.4 LBS | HEART RATE: 65 BPM

## 2020-10-07 DIAGNOSIS — E53.8 B12 DEFICIENCY: ICD-10-CM

## 2020-10-07 DIAGNOSIS — Z12.11 COLON CANCER SCREENING: ICD-10-CM

## 2020-10-07 DIAGNOSIS — E55.9 VITAMIN D DEFICIENCY: ICD-10-CM

## 2020-10-07 DIAGNOSIS — J44.1 CHRONIC OBSTRUCTIVE PULMONARY DISEASE WITH ACUTE EXACERBATION (HCC): ICD-10-CM

## 2020-10-07 DIAGNOSIS — J30.89 SEASONAL ALLERGIC RHINITIS DUE TO OTHER ALLERGIC TRIGGER: ICD-10-CM

## 2020-10-07 DIAGNOSIS — I10 ESSENTIAL HYPERTENSION: ICD-10-CM

## 2020-10-07 DIAGNOSIS — R91.1 LUNG NODULE: ICD-10-CM

## 2020-10-07 DIAGNOSIS — E03.9 HYPOTHYROIDISM, UNSPECIFIED TYPE: ICD-10-CM

## 2020-10-07 DIAGNOSIS — F41.9 ANXIETY: ICD-10-CM

## 2020-10-07 DIAGNOSIS — Z00.00 ENCOUNTER FOR WELLNESS EXAMINATION: Primary | ICD-10-CM

## 2020-10-07 PROCEDURE — 80053 COMPREHEN METABOLIC PANEL: CPT | Performed by: NURSE PRACTITIONER

## 2020-10-07 PROCEDURE — 86803 HEPATITIS C AB TEST: CPT | Performed by: NURSE PRACTITIONER

## 2020-10-07 PROCEDURE — 82306 VITAMIN D 25 HYDROXY: CPT | Performed by: NURSE PRACTITIONER

## 2020-10-07 PROCEDURE — 36415 COLL VENOUS BLD VENIPUNCTURE: CPT | Performed by: NURSE PRACTITIONER

## 2020-10-07 PROCEDURE — 99214 OFFICE O/P EST MOD 30 MIN: CPT | Performed by: NURSE PRACTITIONER

## 2020-10-07 PROCEDURE — 85027 COMPLETE CBC AUTOMATED: CPT | Performed by: NURSE PRACTITIONER

## 2020-10-07 PROCEDURE — 82607 VITAMIN B-12: CPT | Performed by: NURSE PRACTITIONER

## 2020-10-07 PROCEDURE — 80061 LIPID PANEL: CPT | Performed by: NURSE PRACTITIONER

## 2020-10-07 PROCEDURE — 84443 ASSAY THYROID STIM HORMONE: CPT | Performed by: NURSE PRACTITIONER

## 2020-10-07 RX ORDER — BUDESONIDE AND FORMOTEROL FUMARATE DIHYDRATE 160; 4.5 UG/1; UG/1
1 AEROSOL RESPIRATORY (INHALATION)
Qty: 1 INHALER | Refills: 12 | Status: SHIPPED | OUTPATIENT
Start: 2020-10-07 | End: 2021-03-24 | Stop reason: SDUPTHER

## 2020-10-07 RX ORDER — CETIRIZINE HYDROCHLORIDE 10 MG/1
10 TABLET ORAL
Qty: 90 TABLET | Refills: 3 | Status: SHIPPED | OUTPATIENT
Start: 2020-10-07 | End: 2021-08-25 | Stop reason: SDUPTHER

## 2020-10-07 RX ORDER — LORAZEPAM 0.5 MG/1
0.5 TABLET ORAL 2 TIMES DAILY PRN
Qty: 45 TABLET | Refills: 2 | Status: SHIPPED | OUTPATIENT
Start: 2020-10-07 | End: 2021-02-05 | Stop reason: SDUPTHER

## 2020-10-07 RX ORDER — ALBUTEROL SULFATE 90 UG/1
2 AEROSOL, METERED RESPIRATORY (INHALATION) EVERY 4 HOURS PRN
Qty: 18 G | Refills: 5 | Status: SHIPPED | OUTPATIENT
Start: 2020-10-07 | End: 2022-02-03

## 2020-10-07 RX ORDER — LEVOTHYROXINE SODIUM 0.03 MG/1
25 TABLET ORAL DAILY
Qty: 90 TABLET | Refills: 1 | Status: SHIPPED | OUTPATIENT
Start: 2020-10-07 | End: 2021-03-24

## 2020-10-07 RX ORDER — METHYLPREDNISOLONE 4 MG/1
TABLET ORAL
Qty: 21 TABLET | Refills: 0 | Status: SHIPPED | OUTPATIENT
Start: 2020-10-07 | End: 2021-03-24

## 2020-10-07 NOTE — PROGRESS NOTES
"Chief Complaint   Patient presents with   • Hypothyroidism   • Hypertension   • Follow-up     having congestion, coughing phlegm, 6 mo f/u.  wants to discuss changing inhaler to \"air flow\" because it works better.  pt reports that martin ordered cologuard but order not in system.  can you reorder?  she never received box.       Hypothyroidism  Associated symptoms include coughing. Pertinent negatives include no change in bowel habit, chest pain, chills, headaches, nausea, sore throat, urinary symptoms or vomiting. Exacerbated by: mask, heat.   Hypertension  This is a chronic problem. The current episode started more than 1 year ago. The problem is controlled. Associated symptoms include anxiety. Pertinent negatives include no chest pain, headaches, malaise/fatigue or shortness of breath.   Anxiety  Presents for follow-up visit. Symptoms include nervous/anxious behavior. Patient reports no chest pain, decreased concentration, insomnia, nausea, shortness of breath or suicidal ideas. Symptoms occur most days. The severity of symptoms is mild.     Compliance with medications is %.   Cough  This is a chronic problem. The current episode started more than 1 month ago. The problem has been waxing and waning. The problem occurs every few minutes. The cough is productive of sputum (minimal amount of clear sputum). Associated symptoms include nasal congestion, postnasal drip, rhinorrhea and wheezing. Pertinent negatives include no chest pain, chills, ear pain, headaches, sore throat or shortness of breath. The symptoms are aggravated by exercise and other (masks, heat). Risk factors for lung disease include smoking/tobacco exposure. Treatments tried: albuterol inhaler. The treatment provided mild relief. Her past medical history is significant for environmental allergies.     Vitamin b12 deficiency: stable, taking supplement    Vitamin D deficiency: stable, taking supplement    Lung nodule: CT ordered previously, not " completed    Wellness exam: Mammo and cologuard ordered previously, not completed    The following portions of the patient's history were reviewed and updated as appropriate: allergies, current medications, past family history, past medical history, past social history, past surgical history and problem list.    Past Medical History:   Diagnosis Date   • Anxiety 04/19/2017   • Depression    • Fatigue 02/06/2019   • Lung nodule 01/03/2018   • Obesity 04/19/2017   • Preventative health care 04/19/2017   • Upper respiratory infection 02/06/2019   • Visit for screening mammogram 02/06/2019     History reviewed. No pertinent surgical history.  History reviewed. No pertinent family history.  Social History     Tobacco Use   • Smoking status: Current Every Day Smoker   Substance Use Topics   • Alcohol use: No     Frequency: Never         Current Outpatient Medications:   •  Cyanocobalamin (B-12) 1000 MCG capsule, Take 1 capsule by mouth Daily., Disp: 90 capsule, Rfl: 0  •  levothyroxine (SYNTHROID, LEVOTHROID) 25 MCG tablet, Take 1 tablet by mouth Daily., Disp: 90 tablet, Rfl: 1  •  LORazepam (ATIVAN) 0.5 MG tablet, Take 1 tablet by mouth 2 (Two) Times a Day As Needed for Anxiety., Disp: 45 tablet, Rfl: 2  •  sertraline (ZOLOFT) 50 MG tablet, Take 1 tablet by mouth Daily., Disp: 90 tablet, Rfl: 1  •  vitamin D (ERGOCALCIFEROL) 1.25 MG (24805 UT) capsule capsule, Take 1 capsule by mouth 1 (One) Time Per Week., Disp: 12 capsule, Rfl: 3  •  albuterol sulfate HFA (ProAir HFA) 108 (90 Base) MCG/ACT inhaler, Inhale 2 puffs Every 4 (Four) Hours As Needed for Wheezing or Shortness of Air., Disp: 18 g, Rfl: 5  •  cetirizine (zyrTEC) 10 MG tablet, Take 1 tablet by mouth every night at bedtime., Disp: 90 tablet, Rfl: 3  •  methylPREDNISolone (MEDROL) 4 MG dose pack, Take as directed on package instructions., Disp: 21 tablet, Rfl: 0  •  Symbicort 160-4.5 MCG/ACT inhaler, Inhale 1 puff 2 (Two) Times a Day., Disp: 1 inhaler, Rfl:  "12      Review of Systems   Constitutional: Negative for chills and malaise/fatigue.   HENT: Positive for postnasal drip, rhinorrhea and sinus pressure. Negative for ear pain, sore throat and trouble swallowing.    Respiratory: Positive for cough and wheezing. Negative for shortness of breath.    Cardiovascular: Negative for chest pain and leg swelling.   Gastrointestinal: Negative for change in bowel habit, diarrhea, nausea and vomiting.   Allergic/Immunologic: Positive for environmental allergies.   Psychiatric/Behavioral: Negative for decreased concentration and suicidal ideas. The patient is nervous/anxious. The patient does not have insomnia.        Vitals:    10/07/20 1134   BP: 123/81   BP Location: Left arm   Patient Position: Sitting   Cuff Size: Adult   Pulse: 65   Temp: 96.9 °F (36.1 °C)   TempSrc: Skin   SpO2: 98%   Weight: 98.2 kg (216 lb 6.4 oz)   Height: 165.1 cm (65\")     Body mass index is 36.01 kg/m².    Physical Exam  Constitutional:       Appearance: Normal appearance.   HENT:      Nose: Rhinorrhea present. Rhinorrhea is clear.      Right Turbinates: Swollen.      Left Turbinates: Swollen.      Mouth/Throat:      Mouth: Mucous membranes are moist.      Pharynx: Posterior oropharyngeal erythema ( ) present.      Comments: Postnasal drip noted  Eyes:      General:         Right eye: No discharge.         Left eye: No discharge.   Cardiovascular:      Rate and Rhythm: Normal rate and regular rhythm.      Heart sounds: Normal heart sounds.   Pulmonary:      Effort: Pulmonary effort is normal.      Breath sounds: Wheezing ( bilateral upper lobes) present.   Skin:     General: Skin is warm and dry.   Neurological:      Mental Status: She is alert.   Psychiatric:         Mood and Affect: Mood normal.         Judgment: Judgment normal.         No visits with results within 7 Day(s) from this visit.   Latest known visit with results is:   Office Visit on 09/25/2019   Component Date Value Ref Range Status "   • Glucose 09/25/2019 104* 65 - 99 mg/dL Final   • BUN 09/25/2019 16  8 - 20 mg/dL Final   • Creatinine 09/25/2019 0.80  0.40 - 1.00 mg/dL Final   • Sodium 09/25/2019 142  136 - 144 mmol/L Final   • Potassium 09/25/2019 4.7  3.6 - 5.1 mmol/L Final   • Chloride 09/25/2019 107  101 - 111 mmol/L Final   • CO2 09/25/2019 24.0  22.0 - 32.0 mmol/L Final   • Calcium 09/25/2019 9.7  8.9 - 10.3 mg/dL Final   • Total Protein 09/25/2019 7.0  6.1 - 7.9 g/dL Final   • Albumin 09/25/2019 4.20  3.50 - 4.80 g/dL Final   • ALT (SGPT) 09/25/2019 16  14 - 54 U/L Final   • AST (SGOT) 09/25/2019 20  15 - 41 U/L Final   • Alkaline Phosphatase 09/25/2019 72  32 - 91 U/L Final   • Total Bilirubin 09/25/2019 0.4  0.3 - 1.2 mg/dL Final   • eGFR Non African Amer 09/25/2019 76  >60 mL/min/1.73 Final   • Globulin 09/25/2019 2.8  2.5 - 3.8 gm/dL Final   • A/G Ratio 09/25/2019 1.5  1.0 - 1.7 g/dL Final   • BUN/Creatinine Ratio 09/25/2019 20.0  5.4 - 26.2 Final   • Anion Gap 09/25/2019 15.7* 5.0 - 15.0 mmol/L Final   • Total Cholesterol 09/25/2019 236* <=200 mg/dL Final   • Triglycerides 09/25/2019 98  <=150 mg/dL Final   • HDL Cholesterol 09/25/2019 61  >=39 mg/dL Final   • LDL Cholesterol  09/25/2019 182* 0 - 100 mg/dL Final   • VLDL Cholesterol 09/25/2019 19.6  mg/dL Final   • LDL/HDL Ratio 09/25/2019 2.55   Final   • Chol/HDL Ratio 09/25/2019 3.87   Final   • TSH 09/25/2019 6.680* 0.340 - 5.600 uIU/mL Final    Results may be falsely decreased if patient taking Biotin.   • Free T4 09/25/2019 0.74  0.58 - 1.64 ng/dL Final    Results may be falsely increased if patient taking Biotin.   • 25 Hydroxy, Vitamin D 09/25/2019 24.8* 30.0 - 100.0 ng/ml Final   • Vitamin B-12 09/25/2019 670  180 - 914 pg/mL Final    Results may be falsely increased if patient taking Biotin.   • Hemoglobin A1C 09/25/2019 5.3  3.5 - 5.6 % Final   • WBC 09/25/2019 7.40  3.40 - 10.80 10*3/mm3 Final   • RBC 09/25/2019 4.88  3.77 - 5.28 10*6/mm3 Final   • Hemoglobin 09/25/2019  16.3* 12.0 - 15.9 g/dL Final   • Hematocrit 09/25/2019 46.7* 34.0 - 46.6 % Final   • MCV 09/25/2019 95.7  79.0 - 97.0 fL Final   • MCH 09/25/2019 33.4* 26.6 - 33.0 pg Final   • MCHC 09/25/2019 34.9  31.5 - 35.7 g/dL Final   • RDW 09/25/2019 13.5  12.3 - 15.4 % Final   • RDW-SD 09/25/2019 45.1  37.0 - 54.0 fl Final   • MPV 09/25/2019 9.1  6.0 - 12.0 fL Final   • Platelets 09/25/2019 275  140 - 450 10*3/mm3 Final   • Neutrophil % 09/25/2019 54.4  42.7 - 76.0 % Final   • Lymphocyte % 09/25/2019 34.7  19.6 - 45.3 % Final   • Monocyte % 09/25/2019 6.9  5.0 - 12.0 % Final   • Eosinophil % 09/25/2019 2.9  0.3 - 6.2 % Final   • Basophil % 09/25/2019 1.1  0.0 - 1.5 % Final   • Neutrophils, Absolute 09/25/2019 4.00  1.70 - 7.00 10*3/mm3 Final   • Lymphocytes, Absolute 09/25/2019 2.60  0.70 - 3.10 10*3/mm3 Final   • Monocytes, Absolute 09/25/2019 0.50  0.10 - 0.90 10*3/mm3 Final   • Eosinophils, Absolute 09/25/2019 0.20  0.00 - 0.40 10*3/mm3 Final   • Basophils, Absolute 09/25/2019 0.10  0.00 - 0.20 10*3/mm3 Final   • nRBC 09/25/2019 0.3* 0.0 - 0.2 /100 WBC Final       Problem List Items Addressed This Visit        Respiratory    Lung nodule    Current Assessment & Plan     CT reordered, patient to schedule            Other    Anxiety    Current Assessment & Plan     stable on Lorazepam q hs, reordered BID PRN, refill sent         Relevant Medications    sertraline (ZOLOFT) 50 MG tablet    LORazepam (ATIVAN) 0.5 MG tablet      Other Visit Diagnoses     Encounter for wellness examination    -  Primary    pap completed late 2019 with GYN louise Delgadilloo reordered today    Relevant Orders    Hepatitis C Antibody    B12 deficiency        stable, taking b12 daily, labs today    Vitamin D deficiency        stable, taking ergocalciferol weekly, labs today    Hypothyroidism, unspecified type        stable on levothyroxine, TSH ordered today    Relevant Medications    methylPREDNISolone (MEDROL) 4 MG dose pack    levothyroxine  (SYNTHROID, LEVOTHROID) 25 MCG tablet    Essential hypertension        controlled, bp 123/81    Colon cancer screening        cologuard reordered    Relevant Orders    Cologuard - Stool, Per Rectum    Seasonal allergic rhinitis due to other allergic trigger        restart Zyrtec    Relevant Medications    methylPREDNISolone (MEDROL) 4 MG dose pack    Chronic obstructive pulmonary disease with acute exacerbation (CMS/HCC)        Ordered Symbicort 1 puff BID, ordered ProAir for PRN use. start medrol dose pack, call if s/s wonb in 3-4 days.     Relevant Medications    methylPREDNISolone (MEDROL) 4 MG dose pack    cetirizine (zyrTEC) 10 MG tablet    Symbicort 160-4.5 MCG/ACT inhaler    albuterol sulfate HFA (ProAir HFA) 108 (90 Base) MCG/ACT inhaler        Will call results of labs to pt when reviewed.       Return in about 3 months (around 1/7/2021) for COPD, HTN, anxiety. and med refills.

## 2020-10-08 ENCOUNTER — RESULTS ENCOUNTER (OUTPATIENT)
Dept: FAMILY MEDICINE CLINIC | Facility: CLINIC | Age: 50
End: 2020-10-08

## 2020-10-08 DIAGNOSIS — Z12.11 COLON CANCER SCREENING: ICD-10-CM

## 2020-10-08 LAB
25(OH)D3 SERPL-MCNC: 32.3 NG/ML (ref 30–100)
ALBUMIN SERPL-MCNC: 4.4 G/DL (ref 3.5–5.2)
ALBUMIN/GLOB SERPL: 1.4 G/DL
ALP SERPL-CCNC: 91 U/L (ref 39–117)
ALT SERPL W P-5'-P-CCNC: 17 U/L (ref 1–33)
ANION GAP SERPL CALCULATED.3IONS-SCNC: 11.9 MMOL/L (ref 5–15)
AST SERPL-CCNC: 17 U/L (ref 1–32)
BILIRUB SERPL-MCNC: 0.4 MG/DL (ref 0–1.2)
BUN SERPL-MCNC: 11 MG/DL (ref 6–20)
BUN/CREAT SERPL: 14.5 (ref 7–25)
CALCIUM SPEC-SCNC: 9.5 MG/DL (ref 8.6–10.5)
CHLORIDE SERPL-SCNC: 107 MMOL/L (ref 98–107)
CHOLEST SERPL-MCNC: 226 MG/DL (ref 0–200)
CO2 SERPL-SCNC: 24.1 MMOL/L (ref 22–29)
CREAT SERPL-MCNC: 0.76 MG/DL (ref 0.57–1)
DEPRECATED RDW RBC AUTO: 43.1 FL (ref 37–54)
ERYTHROCYTE [DISTWIDTH] IN BLOOD BY AUTOMATED COUNT: 12.7 % (ref 12.3–15.4)
GFR SERPL CREATININE-BSD FRML MDRD: 80 ML/MIN/1.73
GLOBULIN UR ELPH-MCNC: 3.2 GM/DL
GLUCOSE SERPL-MCNC: 110 MG/DL (ref 65–99)
HCT VFR BLD AUTO: 46.3 % (ref 34–46.6)
HCV AB SER DONR QL: NORMAL
HDLC SERPL-MCNC: 56 MG/DL (ref 40–60)
HGB BLD-MCNC: 15.7 G/DL (ref 12–15.9)
LDLC SERPL CALC-MCNC: 147 MG/DL (ref 0–100)
LDLC/HDLC SERPL: 2.63 {RATIO}
MCH RBC QN AUTO: 31.7 PG (ref 26.6–33)
MCHC RBC AUTO-ENTMCNC: 33.9 G/DL (ref 31.5–35.7)
MCV RBC AUTO: 93.3 FL (ref 79–97)
PLATELET # BLD AUTO: 297 10*3/MM3 (ref 140–450)
PMV BLD AUTO: 11.4 FL (ref 6–12)
POTASSIUM SERPL-SCNC: 4 MMOL/L (ref 3.5–5.2)
PROT SERPL-MCNC: 7.6 G/DL (ref 6–8.5)
RBC # BLD AUTO: 4.96 10*6/MM3 (ref 3.77–5.28)
SODIUM SERPL-SCNC: 143 MMOL/L (ref 136–145)
TRIGL SERPL-MCNC: 115 MG/DL (ref 0–150)
TSH SERPL DL<=0.05 MIU/L-ACNC: 4.92 UIU/ML (ref 0.27–4.2)
VIT B12 BLD-MCNC: 906 PG/ML (ref 211–946)
VLDLC SERPL-MCNC: 23 MG/DL (ref 5–40)
WBC # BLD AUTO: 7.19 10*3/MM3 (ref 3.4–10.8)

## 2020-10-09 ENCOUNTER — TRANSCRIBE ORDERS (OUTPATIENT)
Dept: ADMINISTRATIVE | Facility: HOSPITAL | Age: 50
End: 2020-10-09

## 2020-10-09 DIAGNOSIS — Z12.31 SCREENING MAMMOGRAM, ENCOUNTER FOR: Primary | ICD-10-CM

## 2020-11-05 ENCOUNTER — APPOINTMENT (OUTPATIENT)
Dept: MAMMOGRAPHY | Facility: HOSPITAL | Age: 50
End: 2020-11-05

## 2020-11-05 ENCOUNTER — APPOINTMENT (OUTPATIENT)
Dept: CT IMAGING | Facility: HOSPITAL | Age: 50
End: 2020-11-05

## 2020-12-30 DIAGNOSIS — F41.9 ANXIETY: ICD-10-CM

## 2021-01-05 ENCOUNTER — APPOINTMENT (OUTPATIENT)
Dept: MAMMOGRAPHY | Facility: HOSPITAL | Age: 51
End: 2021-01-05

## 2021-01-05 ENCOUNTER — APPOINTMENT (OUTPATIENT)
Dept: CT IMAGING | Facility: HOSPITAL | Age: 51
End: 2021-01-05

## 2021-02-05 DIAGNOSIS — F41.9 ANXIETY: ICD-10-CM

## 2021-02-05 RX ORDER — ERGOCALCIFEROL 1.25 MG/1
50000 CAPSULE ORAL WEEKLY
Qty: 12 CAPSULE | Refills: 3 | Status: SHIPPED | OUTPATIENT
Start: 2021-02-05 | End: 2021-09-04

## 2021-02-05 RX ORDER — LORAZEPAM 0.5 MG/1
0.5 TABLET ORAL 2 TIMES DAILY PRN
Qty: 45 TABLET | Refills: 0 | Status: SHIPPED | OUTPATIENT
Start: 2021-02-05 | End: 2021-03-24 | Stop reason: SDUPTHER

## 2021-02-05 NOTE — TELEPHONE ENCOUNTER
Caller: Romina Camarillo    Relationship: Self    Best call back number: [phone] (H)  Medication needed:   Requested Prescriptions     Pending Prescriptions Disp Refills   • LORazepam (ATIVAN) 0.5 MG tablet 45 tablet 2     Sig: Take 1 tablet by mouth 2 (Two) Times a Day As Needed for Anxiety.       When do you need the refill by: 02/05/21    What details did the patient provide when requesting the medication: PATIENT STATES HAS 2 LEFT    Does the patient have less than a 3 day supply:  [x] Yes  [] No    What is the patient's preferred pharmacy: [facility] #[id number] [place] [id number] [place] [id number] [address] [place] [address] - [phone] [facility] [phone]

## 2021-02-05 NOTE — TELEPHONE ENCOUNTER
lov 10/7/20  Next OV 3/24/21    Last refill:   Xanax 10/7/20 #45 with 2 refills  Vitamin D 2/27/20 #12 with 3 refills

## 2021-03-24 ENCOUNTER — TELEPHONE (OUTPATIENT)
Dept: FAMILY MEDICINE CLINIC | Facility: CLINIC | Age: 51
End: 2021-03-24

## 2021-03-24 ENCOUNTER — TELEMEDICINE (OUTPATIENT)
Dept: FAMILY MEDICINE CLINIC | Facility: CLINIC | Age: 51
End: 2021-03-24

## 2021-03-24 VITALS — HEIGHT: 65 IN | BODY MASS INDEX: 37.15 KG/M2 | WEIGHT: 223 LBS

## 2021-03-24 DIAGNOSIS — F41.9 ANXIETY: ICD-10-CM

## 2021-03-24 DIAGNOSIS — J44.9 CHRONIC OBSTRUCTIVE PULMONARY DISEASE, UNSPECIFIED COPD TYPE (HCC): ICD-10-CM

## 2021-03-24 DIAGNOSIS — E55.9 VITAMIN D DEFICIENCY: ICD-10-CM

## 2021-03-24 DIAGNOSIS — E53.8 B12 DEFICIENCY: ICD-10-CM

## 2021-03-24 DIAGNOSIS — I10 ESSENTIAL HYPERTENSION: ICD-10-CM

## 2021-03-24 DIAGNOSIS — E03.9 HYPOTHYROIDISM, UNSPECIFIED TYPE: Primary | ICD-10-CM

## 2021-03-24 PROCEDURE — 99214 OFFICE O/P EST MOD 30 MIN: CPT | Performed by: NURSE PRACTITIONER

## 2021-03-24 RX ORDER — CETIRIZINE HYDROCHLORIDE 10 MG/1
10 TABLET ORAL
Qty: 90 TABLET | Refills: 3 | Status: CANCELLED | OUTPATIENT
Start: 2021-03-24

## 2021-03-24 RX ORDER — BUDESONIDE AND FORMOTEROL FUMARATE DIHYDRATE 160; 4.5 UG/1; UG/1
1 AEROSOL RESPIRATORY (INHALATION)
Status: CANCELLED | OUTPATIENT
Start: 2021-03-24

## 2021-03-24 RX ORDER — BUDESONIDE AND FORMOTEROL FUMARATE DIHYDRATE 160; 4.5 UG/1; UG/1
1 AEROSOL RESPIRATORY (INHALATION)
Qty: 10.2 G | Refills: 5 | Status: SHIPPED | OUTPATIENT
Start: 2021-03-24 | End: 2021-08-25 | Stop reason: SDUPTHER

## 2021-03-24 RX ORDER — ALBUTEROL SULFATE 90 UG/1
2 AEROSOL, METERED RESPIRATORY (INHALATION) EVERY 4 HOURS PRN
Qty: 18 G | Refills: 5 | Status: CANCELLED | OUTPATIENT
Start: 2021-03-24

## 2021-03-24 RX ORDER — CHOLECALCIFEROL (VITAMIN D3) 25 MCG
1 TABLET,CHEWABLE ORAL DAILY
Qty: 90 CAPSULE | Refills: 3 | Status: SHIPPED | OUTPATIENT
Start: 2021-03-24 | End: 2021-08-25 | Stop reason: SDUPTHER

## 2021-03-24 RX ORDER — LEVOTHYROXINE SODIUM 0.03 MG/1
25 TABLET ORAL DAILY
Qty: 90 TABLET | Refills: 1 | Status: CANCELLED | OUTPATIENT
Start: 2021-03-24

## 2021-03-24 RX ORDER — LORAZEPAM 0.5 MG/1
0.5 TABLET ORAL 2 TIMES DAILY PRN
Qty: 45 TABLET | Refills: 0 | Status: CANCELLED | OUTPATIENT
Start: 2021-03-24

## 2021-03-24 RX ORDER — LORAZEPAM 0.5 MG/1
0.5 TABLET ORAL 2 TIMES DAILY PRN
Qty: 45 TABLET | Refills: 2 | Status: SHIPPED | OUTPATIENT
Start: 2021-03-24 | End: 2021-07-30 | Stop reason: SDUPTHER

## 2021-03-24 RX ORDER — LEVOTHYROXINE SODIUM 0.05 MG/1
50 TABLET ORAL DAILY
Qty: 90 TABLET | Refills: 1 | Status: SHIPPED | OUTPATIENT
Start: 2021-03-24 | End: 2021-08-25 | Stop reason: SDUPTHER

## 2021-03-24 NOTE — PROGRESS NOTES
You have chosen to receive care through a telephone visit. Do you consent to use a telephone visit for your medical care today? Yes      Chief Complaint  Hypothyroidism, Hypertension, Follow-up (wants to discuss a referral to [facility] where she lives in [place]), and Med Refill    Subjective          Romina Camarillo presents to [facility] for   History of Present Illness     Hypothyroidism, patient on 25 mcg levothyroxine, she denies symptoms of constipation, weight gain/loss, hot or cold intolerance, hair loss, abnormal heart rate but does report significant fatigue and is overall more sluggish lately. Previous TSH 4.9, but medication was not adjusted at that time.    B12/vit d deficiency, on vitamin D supplement per RX, she has not picked up B12 at the pharmacy yet, has not taken in several months, she does report fatigue.     Anxiety, stable on Zoloft once daily, and lorazepam daily, occasionally twice daily paient denies significant weight loss/gain, insomnia, hypersomnia, psychomotor agitation, psychomotor retardation, fatigue (loss of energy), feelings of worthlessness (guilt), impaired concentration (indecisiveness), thoughts of death or suicide. She recently moved to [place] and is in process of finding a new pcp there        The following portions of the patient's history were reviewed and updated as appropriate: allergies, current medications, past family history, past medical history, past social history, past surgical history and problem list.    Past Medical History:   Diagnosis Date   • Anxiety 04/19/2017   • Depression    • Fatigue 02/06/2019   • Lung nodule 01/03/2018   • Obesity 04/19/2017   • Preventative health care 04/19/2017   • Upper respiratory infection 02/06/2019   • Visit for screening mammogram 02/06/2019     History reviewed. No pertinent surgical history.  History reviewed. No pertinent family history.  Social History     Tobacco Use   • Smoking  "status: Current Every Day Smoker     Packs/day: 0.50     Types: Cigarettes     Start date: 1998   • Smokeless tobacco: Never Used   Substance Use Topics   • Alcohol use: No       Current Outpatient Medications:   •  albuterol sulfate HFA (ProAir HFA) 108 (90 Base) MCG/ACT inhaler, Inhale 2 puffs Every 4 (Four) Hours As Needed for Wheezing or Shortness of Air., Disp: 18 g, Rfl: 5  •  cetirizine (zyrTEC) 10 MG tablet, Take 1 tablet by mouth every night at bedtime., Disp: 90 tablet, Rfl: 3  •  LORazepam (ATIVAN) 0.5 MG tablet, Take 1 tablet by mouth 2 (Two) Times a Day As Needed for Anxiety., Disp: 45 tablet, Rfl: 2  •  sertraline (ZOLOFT) 50 MG tablet, Take 1 tablet by mouth Daily., Disp: 90 tablet, Rfl: 1  •  vitamin D (ERGOCALCIFEROL) 1.25 MG (49907 UT) capsule capsule, Take 1 capsule by mouth 1 (One) Time Per Week., Disp: 12 capsule, Rfl: 3  •  Cyanocobalamin (B-12) 1000 MCG capsule, Take 1 capsule by mouth Daily., Disp: 90 capsule, Rfl: 3  •  levothyroxine (SYNTHROID, LEVOTHROID) 50 MCG tablet, Take 1 tablet by mouth Daily., Disp: 90 tablet, Rfl: 1  •  Symbicort 160-4.5 MCG/ACT inhaler, Inhale 1 puff 2 (Two) Times a Day., Disp: 10.2 g, Rfl: 5    Objective   Vital Signs:   Ht 165.1 cm (65\")   Wt 101 kg (223 lb)   BMI 37.11 kg/m²       Physical Exam  Constitutional:       General: She is not in acute distress.     Comments: Exam otherwise deferred through video/audio visit   Pulmonary:      Effort: Pulmonary effort is normal.   Neurological:      Mental Status: She is alert and oriented to person, place, and time.   Psychiatric:         Behavior: Behavior normal.         Thought Content: Thought content normal.         Judgment: Judgment normal.          Result Review :     No visits with results within 7 Day(s) from this visit.   Latest known visit with results is:   Office Visit on 10/07/2020   Component Date Value Ref Range Status   • Hepatitis C Ab 10/07/2020 Non-Reactive  Non-Reactive Final   • Vitamin B-12 " 10/07/2020 906  211 - 946 pg/mL Final   • 25 Hydroxy, Vitamin D 10/07/2020 32.3  30.0 - 100.0 ng/ml Final   • TSH 10/07/2020 4.920* 0.270 - 4.200 uIU/mL Final   • WBC 10/07/2020 7.19  3.40 - 10.80 10*3/mm3 Final   • RBC 10/07/2020 4.96  3.77 - 5.28 10*6/mm3 Final   • Hemoglobin 10/07/2020 15.7  12.0 - 15.9 g/dL Final   • Hematocrit 10/07/2020 46.3  34.0 - 46.6 % Final   • MCV 10/07/2020 93.3  79.0 - 97.0 fL Final   • MCH 10/07/2020 31.7  26.6 - 33.0 pg Final   • MCHC 10/07/2020 33.9  31.5 - 35.7 g/dL Final   • RDW 10/07/2020 12.7  12.3 - 15.4 % Final   • RDW-SD 10/07/2020 43.1  37.0 - 54.0 fl Final   • MPV 10/07/2020 11.4  6.0 - 12.0 fL Final   • Platelets 10/07/2020 297  140 - 450 10*3/mm3 Final   • Glucose 10/07/2020 110* 65 - 99 mg/dL Final   • BUN 10/07/2020 11  6 - 20 mg/dL Final   • Creatinine 10/07/2020 0.76  0.57 - 1.00 mg/dL Final   • Sodium 10/07/2020 143  136 - 145 mmol/L Final   • Potassium 10/07/2020 4.0  3.5 - 5.2 mmol/L Final   • Chloride 10/07/2020 107  98 - 107 mmol/L Final   • CO2 10/07/2020 24.1  22.0 - 29.0 mmol/L Final   • Calcium 10/07/2020 9.5  8.6 - 10.5 mg/dL Final   • Total Protein 10/07/2020 7.6  6.0 - 8.5 g/dL Final   • Albumin 10/07/2020 4.40  3.50 - 5.20 g/dL Final   • ALT (SGPT) 10/07/2020 17  1 - 33 U/L Final   • AST (SGOT) 10/07/2020 17  1 - 32 U/L Final   • Alkaline Phosphatase 10/07/2020 91  39 - 117 U/L Final   • Total Bilirubin 10/07/2020 0.4  0.0 - 1.2 mg/dL Final   • eGFR Non African Amer 10/07/2020 80  >60 mL/min/1.73 Final   • Globulin 10/07/2020 3.2  gm/dL Final   • A/G Ratio 10/07/2020 1.4  g/dL Final   • BUN/Creatinine Ratio 10/07/2020 14.5  7.0 - 25.0 Final   • Anion Gap 10/07/2020 11.9  5.0 - 15.0 mmol/L Final   • Total Cholesterol 10/07/2020 226* 0 - 200 mg/dL Final   • Triglycerides 10/07/2020 115  0 - 150 mg/dL Final   • HDL Cholesterol 10/07/2020 56  40 - 60 mg/dL Final   • LDL Cholesterol  10/07/2020 147* 0 - 100 mg/dL Final   • VLDL Cholesterol 10/07/2020 23  5 -  40 mg/dL Final   • LDL/HDL Ratio 10/07/2020 2.63   Final                       Assessment and Plan    Diagnoses and all orders for this visit:    1. Hypothyroidism, unspecified type (Primary)  Comments:  Prev TSH 4.9, pt now with significant fatigue and feeling sluggish.  Will increase levothyroxine to 50 mcg, pt will come to [facility] for labs as ordered in 6 wks.   Orders:  -     levothyroxine (SYNTHROID, LEVOTHROID) 50 MCG tablet; Take 1 tablet by mouth Daily.  Dispense: 90 tablet; Refill: 1  -     TSH; Future    2. Anxiety  Comments:  stable on Zoloft q am. Lorazepam q hs and occasionally BID PRN, refill sent to new pharmacy in [place]    Orders:  -     sertraline (ZOLOFT) 50 MG tablet; Take 1 tablet by mouth Daily.  Dispense: 90 tablet; Refill: 1  -     LORazepam (ATIVAN) 0.5 MG tablet; Take 1 tablet by mouth 2 (Two) Times a Day As Needed for Anxiety.  Dispense: 45 tablet; Refill: 2    3. B12 deficiency  Comments:  Check B12 level, patient has not started B12 OTC yet  Orders:  -     Vitamin B12; Future  -     Cyanocobalamin (B-12) 1000 MCG capsule; Take 1 capsule by mouth Daily.  Dispense: 90 capsule; Refill: 3    4. Vitamin D deficiency  Comments:  Continue weekly ergocalciferol, no refill needed at this time  Orders:  -     Vitamin D 25 Hydroxy; Future    5. Essential hypertension  Comments:  stable outside of office, cont to monitor.  Notify if consistently greater than 140 systolic  Orders:  -     Lipid Panel; Future  -     Comprehensive Metabolic Panel; Future  -     CBC (No Diff); Future    6. Chronic obstructive pulmonary disease, unspecified COPD type (CMS/[facility])  Comments:  Mild, continue/refill Symbicort, patient has albuterol inhaler  Orders:  -     Symbicort 160-4.5 MCG/ACT inhaler; Inhale 1 puff 2 (Two) Times a Day.  Dispense: 10.2 g; Refill: 5      -Patient recently moved to [place], however in the period of time she is looking for a provider in [facility] she does come to this area on Fridays  occasionally and will schedule her labs to be done in this office in approximately 6 weeks.  -If she desires to continue coming to this office that would be fine as well we will see her in 3 months    I spent 25 minutes caring for Romina on this date of service. This time includes time spent by me in the following activities:preparing for the visit, reviewing tests, performing a medically appropriate examination and/or evaluation , counseling and educating the patient/family/caregiver, ordering medications, tests, or procedures and documenting information in the medical record    Follow Up   Return for schedule labs in 6 weeks (pt now lives out of town, will return for labs).  Patient was given instructions and counseling regarding her condition or for health maintenance advice. Please see specific information pulled into the AVS if appropriate.    This was an audio and video enabled telemedicine encounter.  Total time of discussion was 25 minutes

## 2021-03-24 NOTE — TELEPHONE ENCOUNTER
Caller: Romina Camarillo    Relationship: Self    Best call back number: [phone]    Medication needed:   Requested Prescriptions     Pending Prescriptions Disp Refills   • albuterol sulfate HFA (ProAir HFA) 108 (90 Base) MCG/ACT inhaler 18 g 5     Sig: Inhale 2 puffs Every 4 (Four) Hours As Needed for Wheezing or Shortness of Air.   • cetirizine (zyrTEC) 10 MG tablet 90 tablet 3     Sig: Take 1 tablet by mouth every night at bedtime.   • levothyroxine (SYNTHROID, LEVOTHROID) 25 MCG tablet 90 tablet 1     Sig: Take 1 tablet by mouth Daily.   • LORazepam (ATIVAN) 0.5 MG tablet 45 tablet 0     Sig: Take 1 tablet by mouth 2 (Two) Times a Day As Needed for Anxiety.   • sertraline (ZOLOFT) 50 MG tablet 90 tablet 1     Sig: Take 1 tablet by mouth Daily.   • Symbicort 160-4.5 MCG/ACT inhaler       Sig: Inhale 1 puff 2 (Two) Times a Day.       When do you need the refill by: 03/24    What additional details did the patient provide when requesting the medication: PATIENT HAS A VIDEO VISIT AT 3 TODAY WITH BETITO WADE.. SHE JUST MOVED TO [place] WHICH IS [address] FROM OUR OFFICE BUT SHE WAS ON HER WAY TO HER IN OFFICE APT AT 10:15AM BUT HER TIRE BLEW OUT SHES ON THE SIDE OF THE ROAD NOW WAITING ON A TOW TRUCK, NEXT AVAILABLE OFFICE VISIT WITH BETITO WADE WAS April 7TH PATIENT CAN'T WAIT THAT LONG FOR HER MEDS... SHE WOULD LIKE TO TRANSFER TO A DOC CLOSER TO HER... BUT NEEDS BETITO TO FILL HER SCRIPTS TODAY BECAUSE SHE IS COMPLETELY OUT OF THEM ALL... SHE WILL DO A VIDEO VISIT TODAY.     Does the patient have less than a 3 day supply:  [x] Yes  [] No    What is the patient's preferred pharmacy: [id number] - [facility] [place] - [address] AT [facility] [address] - [phone] [facility] [phone] FX

## 2021-03-24 NOTE — TELEPHONE ENCOUNTER
Sorry about the situation, not a problem, I will refill at video visit appt this afternoon. Thanks.

## 2021-05-11 ENCOUNTER — TELEPHONE (OUTPATIENT)
Dept: FAMILY MEDICINE CLINIC | Facility: CLINIC | Age: 51
End: 2021-05-11

## 2021-05-11 NOTE — TELEPHONE ENCOUNTER
Caller: Romina Camarillo    Relationship: Self    Best call back number: [phone]    What medication are you requesting: UTI ANTIBIOTIC    What are your current symptoms: STOMACH ACHING, LOWER ABDOMINAL PAIN, FREQUENT URINATION    How long have you been experiencing symptoms: A COUPLE OF DAYS     Have you had these symptoms before:    [x] Yes  [] No    Have you been treated for these symptoms before:   [x] Yes  [] No    If a prescription is needed, what is your preferred pharmacy and phone number:      [facility] #[id number] [place], [place] - [address] AT [facility] [address] - [phone] [facility] [address] [phone] FX        Additional notes: PATIENT CALLED IN STATING SHE IS HAVING UTI SYMPTOMS. SHE WOULD LIKE TO HAVE ANTIBIOTICS PRESCRIBED TO TAKE CARE OF SYMPTOMS. PLEASE CALL TO ADVISE.

## 2021-05-11 NOTE — TELEPHONE ENCOUNTER
I called patient about coming in today and leaving a urine sample and she stated she is in [place] and may try to see someone there if it gets too bad. She said she is going to work on getting lots of water and drink some cranberry to see if that helps. She gets back into town on Friday and she stated if she still has issues and has not been seen while out of town she will come in to leave a sample.

## 2021-05-11 NOTE — TELEPHONE ENCOUNTER
Ok, yes, pls have her Increase water intake, limit 4 C's; citrus, carbonation, chocolate and caffeine. Try cranberry pills or juice daily. Please reinforce pt to wipe front to back, no tub bathing and urinate after any sexual intercourse. If worse or no better let me know and if she is down this way she can stop in and leave a urine sample.

## 2021-07-09 ENCOUNTER — TELEPHONE (OUTPATIENT)
Dept: FAMILY MEDICINE CLINIC | Facility: CLINIC | Age: 51
End: 2021-07-09

## 2021-07-09 NOTE — TELEPHONE ENCOUNTER
Left message to remind pt that she is due for fasting lab work. Advised she contact the office to get this scheduled.

## 2021-07-30 DIAGNOSIS — F41.9 ANXIETY: ICD-10-CM

## 2021-08-02 RX ORDER — LORAZEPAM 0.5 MG/1
0.5 TABLET ORAL 2 TIMES DAILY PRN
Qty: 45 TABLET | Refills: 0 | Status: SHIPPED | OUTPATIENT
Start: 2021-08-02 | End: 2021-08-25 | Stop reason: SDUPTHER

## 2021-08-25 ENCOUNTER — OFFICE VISIT (OUTPATIENT)
Dept: FAMILY MEDICINE CLINIC | Facility: CLINIC | Age: 51
End: 2021-08-25

## 2021-08-25 VITALS
WEIGHT: 230.4 LBS | DIASTOLIC BLOOD PRESSURE: 76 MMHG | TEMPERATURE: 98 F | HEIGHT: 65 IN | HEART RATE: 80 BPM | SYSTOLIC BLOOD PRESSURE: 110 MMHG | BODY MASS INDEX: 38.39 KG/M2 | OXYGEN SATURATION: 97 %

## 2021-08-25 DIAGNOSIS — B37.2 CANDIDAL SKIN INFECTION: ICD-10-CM

## 2021-08-25 DIAGNOSIS — E55.9 VITAMIN D DEFICIENCY: ICD-10-CM

## 2021-08-25 DIAGNOSIS — F41.9 ANXIETY: ICD-10-CM

## 2021-08-25 DIAGNOSIS — Z12.11 COLON CANCER SCREENING: ICD-10-CM

## 2021-08-25 DIAGNOSIS — J44.9 CHRONIC OBSTRUCTIVE PULMONARY DISEASE, UNSPECIFIED COPD TYPE (HCC): ICD-10-CM

## 2021-08-25 DIAGNOSIS — E03.9 HYPOTHYROIDISM, UNSPECIFIED TYPE: Primary | ICD-10-CM

## 2021-08-25 DIAGNOSIS — I10 ESSENTIAL HYPERTENSION: ICD-10-CM

## 2021-08-25 DIAGNOSIS — E53.8 B12 DEFICIENCY: ICD-10-CM

## 2021-08-25 PROCEDURE — 99214 OFFICE O/P EST MOD 30 MIN: CPT | Performed by: NURSE PRACTITIONER

## 2021-08-25 PROCEDURE — 80061 LIPID PANEL: CPT | Performed by: NURSE PRACTITIONER

## 2021-08-25 PROCEDURE — 80053 COMPREHEN METABOLIC PANEL: CPT | Performed by: NURSE PRACTITIONER

## 2021-08-25 PROCEDURE — 82607 VITAMIN B-12: CPT | Performed by: NURSE PRACTITIONER

## 2021-08-25 PROCEDURE — 82306 VITAMIN D 25 HYDROXY: CPT | Performed by: NURSE PRACTITIONER

## 2021-08-25 PROCEDURE — 84443 ASSAY THYROID STIM HORMONE: CPT | Performed by: NURSE PRACTITIONER

## 2021-08-25 PROCEDURE — 85027 COMPLETE CBC AUTOMATED: CPT | Performed by: NURSE PRACTITIONER

## 2021-08-25 RX ORDER — NYSTATIN 100000 [USP'U]/G
POWDER TOPICAL 3 TIMES DAILY PRN
Qty: 60 G | Refills: 0 | Status: SHIPPED | OUTPATIENT
Start: 2021-08-25

## 2021-08-25 RX ORDER — LORAZEPAM 0.5 MG/1
0.5 TABLET ORAL 2 TIMES DAILY PRN
Qty: 45 TABLET | Refills: 2 | Status: SHIPPED | OUTPATIENT
Start: 2021-08-25 | End: 2022-02-03

## 2021-08-25 RX ORDER — CETIRIZINE HYDROCHLORIDE 10 MG/1
10 TABLET ORAL
Qty: 90 TABLET | Refills: 3 | Status: SHIPPED | OUTPATIENT
Start: 2021-08-25 | End: 2022-04-11 | Stop reason: SDUPTHER

## 2021-08-25 RX ORDER — CHOLECALCIFEROL (VITAMIN D3) 25 MCG
1 TABLET,CHEWABLE ORAL DAILY
Qty: 90 CAPSULE | Refills: 3 | Status: SHIPPED | OUTPATIENT
Start: 2021-08-25 | End: 2021-10-26 | Stop reason: SDUPTHER

## 2021-08-25 RX ORDER — BUDESONIDE AND FORMOTEROL FUMARATE DIHYDRATE 160; 4.5 UG/1; UG/1
1 AEROSOL RESPIRATORY (INHALATION)
Qty: 10.2 G | Refills: 5 | Status: SHIPPED | OUTPATIENT
Start: 2021-08-25 | End: 2022-04-15

## 2021-08-25 RX ORDER — LEVOTHYROXINE SODIUM 0.05 MG/1
50 TABLET ORAL DAILY
Qty: 90 TABLET | Refills: 1 | Status: SHIPPED | OUTPATIENT
Start: 2021-08-25 | End: 2022-04-14 | Stop reason: SDUPTHER

## 2021-08-25 NOTE — PROGRESS NOTES
Chief Complaint  Hypothyroidism, Anxiety, Hypertension, COPD, and Follow-up (5 mo f/u; pls make note for kasie to schedule mwv with next f/u; pt is not taking b12, but said she needs a rf if you want her to cont.)    Subjective          Romina Camarillo presents to [facility] for   History of Present Illness     Hypothyroidism,  denies symptoms of constipation, weight gain/loss, hot or cold intolerance, hair loss, abnormal heart rate  and fatigue.    B12/vit d deficiency, on vitamin D  weekly.  Pharmacy did not fill B12 Rx.     Anxiety, stable on Zoloft once daily, lorazepam  1-2 times daily as needed, but typically only at night.  Paient denies significant weight loss/gain, insomnia, hypersomnia, psychomotor agitation, psychomotor retardation, fatigue (loss of energy), feelings of worthlessness (guilt), impaired concentration (indecisiveness), thoughts of death or suicide.    COPD: The patient has been previously diagnosed with COPD. Symptoms include dyspnea, non-productive cough and wheezing, especially with exertion.   Symptoms have been gradually improving since since starting Symbicort and albuterol inhalers.    Rash, present under both breasts has developed over the last few days, reports working outside in the heat and sweating      The following portions of the patient's history were reviewed and updated as appropriate: allergies, current medications, past family history, past medical history, past social history, past surgical history and problem list.    Past Medical History:   Diagnosis Date   • Anxiety 04/19/2017   • Depression    • Fatigue 02/06/2019   • Lung nodule 01/03/2018   • Obesity 04/19/2017   • Preventative health care 04/19/2017   • Upper respiratory infection 02/06/2019   • Visit for screening mammogram 02/06/2019     History reviewed. No pertinent surgical history.  History reviewed. No pertinent family history.  Social History     Tobacco Use   • Smoking  "status: Current Every Day Smoker     Packs/day: 0.50     Years: 0.00     Pack years: 0.00     Types: Cigarettes     Start date: 1998   • Smokeless tobacco: Never Used   Substance Use Topics   • Alcohol use: No       Current Outpatient Medications:   •  albuterol sulfate HFA (ProAir HFA) 108 (90 Base) MCG/ACT inhaler, Inhale 2 puffs Every 4 (Four) Hours As Needed for Wheezing or Shortness of Air., Disp: 18 g, Rfl: 5  •  cetirizine (zyrTEC) 10 MG tablet, Take 1 tablet by mouth every night at bedtime., Disp: 90 tablet, Rfl: 3  •  levothyroxine (SYNTHROID, LEVOTHROID) 50 MCG tablet, Take 1 tablet by mouth Daily., Disp: 90 tablet, Rfl: 1  •  LORazepam (ATIVAN) 0.5 MG tablet, Take 1 tablet by mouth 2 (Two) Times a Day As Needed for Anxiety., Disp: 45 tablet, Rfl: 2  •  sertraline (ZOLOFT) 50 MG tablet, Take 1 tablet by mouth Daily., Disp: 90 tablet, Rfl: 1  •  Symbicort 160-4.5 MCG/ACT inhaler, Inhale 1 puff 2 (Two) Times a Day., Disp: 10.2 g, Rfl: 5  •  vitamin D (ERGOCALCIFEROL) 1.25 MG (50640 UT) capsule capsule, Take 1 capsule by mouth 1 (One) Time Per Week., Disp: 12 capsule, Rfl: 3  •  Cyanocobalamin (B-12) 1000 MCG capsule, Take 1 capsule by mouth Daily., Disp: 90 capsule, Rfl: 3  •  nystatin (MYCOSTATIN) 271576 UNIT/GM powder, Apply  topically to the appropriate area as directed 3 (Three) Times a Day As Needed (rash)., Disp: 60 g, Rfl: 0    Objective   Vital Signs:   /76 (BP Location: Left arm, Patient Position: Sitting, Cuff Size: Large Adult)   Pulse 80   Temp 98 °F (36.7 °C) (Infrared)   Ht 165.1 cm (65\")   Wt 105 kg (230 lb 6.4 oz)   SpO2 97%   BMI 38.34 kg/m²       Physical Exam  Vitals and nursing note reviewed.   Constitutional:       General: She is not in acute distress.     Appearance: She is well-developed. She is not diaphoretic.   Eyes:      Pupils: Pupils are equal, round, and reactive to light.   Neck:      Thyroid: No thyromegaly.      Vascular: No JVD.   Cardiovascular:      Rate and " Rhythm: Normal rate and regular rhythm.      Heart sounds: Normal heart sounds. No murmur heard.     Pulmonary:      Effort: Pulmonary effort is normal. No respiratory distress.      Breath sounds: Normal breath sounds.   Abdominal:      General: Bowel sounds are normal. There is no distension.      Palpations: Abdomen is soft.      Tenderness: There is no abdominal tenderness.   Musculoskeletal:         General: No tenderness. Normal range of motion.      Cervical back: Normal range of motion and neck supple.   Skin:     General: Skin is warm and dry.   Neurological:      Mental Status: She is alert and oriented to person, place, and time.      Sensory: No sensory deficit.   Psychiatric:         Behavior: Behavior normal.         Thought Content: Thought content normal.         Judgment: Judgment normal.          Result Review :     No visits with results within 7 Day(s) from this visit.   Latest known visit with results is:   Office Visit on 10/07/2020   Component Date Value Ref Range Status   • Hepatitis C Ab 10/07/2020 Non-Reactive  Non-Reactive Final   • Vitamin B-12 10/07/2020 906  211 - 946 pg/mL Final   • 25 Hydroxy, Vitamin D 10/07/2020 32.3  30.0 - 100.0 ng/ml Final   • TSH 10/07/2020 4.920* 0.270 - 4.200 uIU/mL Final   • WBC 10/07/2020 7.19  3.40 - 10.80 10*3/mm3 Final   • RBC 10/07/2020 4.96  3.77 - 5.28 10*6/mm3 Final   • Hemoglobin 10/07/2020 15.7  12.0 - 15.9 g/dL Final   • Hematocrit 10/07/2020 46.3  34.0 - 46.6 % Final   • MCV 10/07/2020 93.3  79.0 - 97.0 fL Final   • MCH 10/07/2020 31.7  26.6 - 33.0 pg Final   • MCHC 10/07/2020 33.9  31.5 - 35.7 g/dL Final   • RDW 10/07/2020 12.7  12.3 - 15.4 % Final   • RDW-SD 10/07/2020 43.1  37.0 - 54.0 fl Final   • MPV 10/07/2020 11.4  6.0 - 12.0 fL Final   • Platelets 10/07/2020 297  140 - 450 10*3/mm3 Final   • Glucose 10/07/2020 110* 65 - 99 mg/dL Final   • BUN 10/07/2020 11  6 - 20 mg/dL Final   • Creatinine 10/07/2020 0.76  0.57 - 1.00 mg/dL Final   •  Sodium 10/07/2020 143  136 - 145 mmol/L Final   • Potassium 10/07/2020 4.0  3.5 - 5.2 mmol/L Final   • Chloride 10/07/2020 107  98 - 107 mmol/L Final   • CO2 10/07/2020 24.1  22.0 - 29.0 mmol/L Final   • Calcium 10/07/2020 9.5  8.6 - 10.5 mg/dL Final   • Total Protein 10/07/2020 7.6  6.0 - 8.5 g/dL Final   • Albumin 10/07/2020 4.40  3.50 - 5.20 g/dL Final   • ALT (SGPT) 10/07/2020 17  1 - 33 U/L Final   • AST (SGOT) 10/07/2020 17  1 - 32 U/L Final   • Alkaline Phosphatase 10/07/2020 91  39 - 117 U/L Final   • Total Bilirubin 10/07/2020 0.4  0.0 - 1.2 mg/dL Final   • eGFR Non African Amer 10/07/2020 80  >60 mL/min/1.73 Final   • Globulin 10/07/2020 3.2  gm/dL Final   • A/G Ratio 10/07/2020 1.4  g/dL Final   • BUN/Creatinine Ratio 10/07/2020 14.5  7.0 - 25.0 Final   • Anion Gap 10/07/2020 11.9  5.0 - 15.0 mmol/L Final   • Total Cholesterol 10/07/2020 226* 0 - 200 mg/dL Final   • Triglycerides 10/07/2020 115  0 - 150 mg/dL Final   • HDL Cholesterol 10/07/2020 56  40 - 60 mg/dL Final   • LDL Cholesterol  10/07/2020 147* 0 - 100 mg/dL Final   • VLDL Cholesterol 10/07/2020 23  5 - 40 mg/dL Final   • LDL/HDL Ratio 10/07/2020 2.63   Final                       Assessment and Plan    Diagnoses and all orders for this visit:    1. Hypothyroidism, unspecified type (Primary)  Comments:  recheck TSh today. cont/refill levothyroxine, will adjust if needed  Orders:  -     levothyroxine (SYNTHROID, LEVOTHROID) 50 MCG tablet; Take 1 tablet by mouth Daily.  Dispense: 90 tablet; Refill: 1  -     TSH    2. Anxiety  Comments:  stable on Zoloft q am. Lorazepam q hs and occasionally BID PRN, refilled  Orders:  -     sertraline (ZOLOFT) 50 MG tablet; Take 1 tablet by mouth Daily.  Dispense: 90 tablet; Refill: 1  -     LORazepam (ATIVAN) 0.5 MG tablet; Take 1 tablet by mouth 2 (Two) Times a Day As Needed for Anxiety.  Dispense: 45 tablet; Refill: 2    3. B12 deficiency  Comments:  Check B12 level, patient has not started B12, sent rx to  pharmacy  Orders:  -     Cyanocobalamin (B-12) 1000 MCG capsule; Take 1 capsule by mouth Daily.  Dispense: 90 capsule; Refill: 3  -     Vitamin B12    4. Chronic obstructive pulmonary disease, unspecified COPD type (CMS/[facility])  Comments:  Mild, continue/refill Symbicort, patient has albuterol inhaler  Orders:  -     Symbicort 160-4.5 MCG/ACT inhaler; Inhale 1 puff 2 (Two) Times a Day.  Dispense: 10.2 g; Refill: 5    5. Colon cancer screening  Comments:  Reordered Cologuard, patient moved and never received previous kit  Orders:  -     Cologuard - Stool, Per Rectum; Future    6. Essential hypertension  Comments:  BP stable  Orders:  -     CBC (No Diff)  -     Comprehensive Metabolic Panel  -     Lipid Panel    7. Vitamin D deficiency  Comments:  Continue weekly ergocalciferol, check vitamin D level today  Orders:  -     Vitamin D 25 Hydroxy    8. Candidal skin infection  Comments:  Start nystatin powder under both breasts, wear a bra and prevent skin to skin contact    Other orders  -     nystatin (MYCOSTATIN) 153756 UNIT/GM powder; Apply  topically to the appropriate area as directed 3 (Three) Times a Day As Needed (rash).  Dispense: 60 g; Refill: 0  -     cetirizine (zyrTEC) 10 MG tablet; Take 1 tablet by mouth every night at bedtime.  Dispense: 90 tablet; Refill: 3      We will plan to see patient back in 3 months for Medicare wellness visit in 6 months to  follow-up on chronic conditions      I spent 30 minutes caring for Romina Camarillo on this date of service. This time includes time spent by me in the following activities: preparing for the visit, reviewing tests, performing a medically appropriate examination and/or evaluation , counseling and educating the patient/family/caregiver, ordering medications, tests, or procedures and documenting information in the medical record        Follow Up     Return in about 3 months (around 11/25/2021) for Medicare Wellness. .  Patient was given instructions and counseling  regarding her condition or for health maintenance advice. Please see specific information pulled into the AVS if appropriate.      EMR Dragon transcription disclaimer:  Some of this encounter note is an electronic transcription translation of spoken language to printed text. The electronic translation of spoken language may permit erroneous, or at times, nonsensical words or phrases to be inadvertently transcribed; Although I have reviewed the note for such errors some may still exist.

## 2021-08-26 LAB
25(OH)D3 SERPL-MCNC: 22.3 NG/ML (ref 30–100)
ALBUMIN SERPL-MCNC: 4.1 G/DL (ref 3.5–5.2)
ALBUMIN/GLOB SERPL: 1.4 G/DL
ALP SERPL-CCNC: 81 U/L (ref 39–117)
ALT SERPL W P-5'-P-CCNC: 14 U/L (ref 1–33)
ANION GAP SERPL CALCULATED.3IONS-SCNC: 10.1 MMOL/L (ref 5–15)
AST SERPL-CCNC: 16 U/L (ref 1–32)
BILIRUB SERPL-MCNC: 0.3 MG/DL (ref 0–1.2)
BUN SERPL-MCNC: 12 MG/DL (ref 6–20)
BUN/CREAT SERPL: 14.3 (ref 7–25)
CALCIUM SPEC-SCNC: 9.1 MG/DL (ref 8.6–10.5)
CHLORIDE SERPL-SCNC: 104 MMOL/L (ref 98–107)
CHOLEST SERPL-MCNC: 210 MG/DL (ref 0–200)
CO2 SERPL-SCNC: 25.9 MMOL/L (ref 22–29)
CREAT SERPL-MCNC: 0.84 MG/DL (ref 0.57–1)
DEPRECATED RDW RBC AUTO: 46.3 FL (ref 37–54)
ERYTHROCYTE [DISTWIDTH] IN BLOOD BY AUTOMATED COUNT: 13.2 % (ref 12.3–15.4)
GFR SERPL CREATININE-BSD FRML MDRD: 71 ML/MIN/1.73
GLOBULIN UR ELPH-MCNC: 3 GM/DL
GLUCOSE SERPL-MCNC: 137 MG/DL (ref 65–99)
HCT VFR BLD AUTO: 42.7 % (ref 34–46.6)
HDLC SERPL-MCNC: 63 MG/DL (ref 40–60)
HGB BLD-MCNC: 14.5 G/DL (ref 12–15.9)
LDLC SERPL CALC-MCNC: 124 MG/DL (ref 0–100)
LDLC/HDLC SERPL: 1.93 {RATIO}
MCH RBC QN AUTO: 32.4 PG (ref 26.6–33)
MCHC RBC AUTO-ENTMCNC: 34 G/DL (ref 31.5–35.7)
MCV RBC AUTO: 95.3 FL (ref 79–97)
PLATELET # BLD AUTO: 314 10*3/MM3 (ref 140–450)
PMV BLD AUTO: 11.4 FL (ref 6–12)
POTASSIUM SERPL-SCNC: 3.9 MMOL/L (ref 3.5–5.2)
PROT SERPL-MCNC: 7.1 G/DL (ref 6–8.5)
RBC # BLD AUTO: 4.48 10*6/MM3 (ref 3.77–5.28)
SODIUM SERPL-SCNC: 140 MMOL/L (ref 136–145)
TRIGL SERPL-MCNC: 128 MG/DL (ref 0–150)
TSH SERPL DL<=0.05 MIU/L-ACNC: 3.77 UIU/ML (ref 0.27–4.2)
VIT B12 BLD-MCNC: 786 PG/ML (ref 211–946)
VLDLC SERPL-MCNC: 23 MG/DL (ref 5–40)
WBC # BLD AUTO: 6.84 10*3/MM3 (ref 3.4–10.8)

## 2021-09-04 RX ORDER — ERGOCALCIFEROL 1.25 MG/1
CAPSULE ORAL
Qty: 15 CAPSULE | Refills: 1 | Status: SHIPPED | OUTPATIENT
Start: 2021-09-04 | End: 2021-10-26 | Stop reason: SDUPTHER

## 2021-10-21 DIAGNOSIS — F41.9 ANXIETY: ICD-10-CM

## 2021-10-22 RX ORDER — CETIRIZINE HYDROCHLORIDE 10 MG/1
TABLET ORAL
Qty: 90 TABLET | Refills: 3 | OUTPATIENT
Start: 2021-10-22

## 2021-10-26 DIAGNOSIS — E53.8 B12 DEFICIENCY: ICD-10-CM

## 2021-10-26 DIAGNOSIS — F41.9 ANXIETY: ICD-10-CM

## 2021-10-26 RX ORDER — CHOLECALCIFEROL (VITAMIN D3) 25 MCG
1 TABLET,CHEWABLE ORAL DAILY
Qty: 90 CAPSULE | Refills: 3 | Status: SHIPPED | OUTPATIENT
Start: 2021-10-26

## 2021-10-26 RX ORDER — ERGOCALCIFEROL 1.25 MG/1
CAPSULE ORAL
Qty: 15 CAPSULE | Refills: 1 | Status: SHIPPED | OUTPATIENT
Start: 2021-10-26 | End: 2022-04-14 | Stop reason: SDUPTHER

## 2022-02-03 DIAGNOSIS — F41.9 ANXIETY: ICD-10-CM

## 2022-02-03 RX ORDER — ALBUTEROL SULFATE 90 UG/1
AEROSOL, METERED RESPIRATORY (INHALATION)
Qty: 18 G | Refills: 5 | Status: SHIPPED | OUTPATIENT
Start: 2022-02-03 | End: 2022-10-05 | Stop reason: SDUPTHER

## 2022-02-03 RX ORDER — LORAZEPAM 0.5 MG/1
TABLET ORAL
Qty: 45 TABLET | Refills: 0 | Status: SHIPPED | OUTPATIENT
Start: 2022-02-03 | End: 2022-03-28 | Stop reason: SDUPTHER

## 2022-02-03 NOTE — TELEPHONE ENCOUNTER
Patient called approx 12:04 this afternoon regarding needing a refill on her ativan to hold her over until her appointment which she scheduled for 2/8/22.

## 2022-02-15 ENCOUNTER — TELEPHONE (OUTPATIENT)
Dept: FAMILY MEDICINE CLINIC | Facility: CLINIC | Age: 52
End: 2022-02-15

## 2022-02-16 ENCOUNTER — CLINICAL SUPPORT (OUTPATIENT)
Dept: FAMILY MEDICINE CLINIC | Facility: CLINIC | Age: 52
End: 2022-02-16

## 2022-02-16 DIAGNOSIS — R82.90 ABNORMAL FINDING ON URINALYSIS: ICD-10-CM

## 2022-02-16 DIAGNOSIS — M54.50 LOW BACK PAIN WITHOUT SCIATICA, UNSPECIFIED BACK PAIN LATERALITY, UNSPECIFIED CHRONICITY: Primary | ICD-10-CM

## 2022-02-16 LAB
BILIRUB BLD-MCNC: NEGATIVE MG/DL
CLARITY, POC: ABNORMAL
COLOR UR: YELLOW
GLUCOSE UR STRIP-MCNC: NEGATIVE MG/DL
KETONES UR QL: NEGATIVE
LEUKOCYTE EST, POC: ABNORMAL
NITRITE UR-MCNC: NEGATIVE MG/ML
PH UR: 6 [PH] (ref 5–8)
PROT UR STRIP-MCNC: ABNORMAL MG/DL
RBC # UR STRIP: ABNORMAL /UL
SP GR UR: 1.03 (ref 1–1.03)
UROBILINOGEN UR QL: NORMAL

## 2022-02-16 PROCEDURE — 81002 URINALYSIS NONAUTO W/O SCOPE: CPT | Performed by: NURSE PRACTITIONER

## 2022-02-16 PROCEDURE — 87086 URINE CULTURE/COLONY COUNT: CPT | Performed by: NURSE PRACTITIONER

## 2022-02-17 LAB — BACTERIA SPEC AEROBE CULT: NO GROWTH

## 2022-03-28 DIAGNOSIS — F41.9 ANXIETY: ICD-10-CM

## 2022-03-28 RX ORDER — LORAZEPAM 0.5 MG/1
0.5 TABLET ORAL 2 TIMES DAILY PRN
Qty: 45 TABLET | Refills: 0 | Status: SHIPPED | OUTPATIENT
Start: 2022-03-28 | End: 2022-05-16 | Stop reason: SDUPTHER

## 2022-03-28 NOTE — TELEPHONE ENCOUNTER
Caller: Romina Camarillo    Relationship: Self    Best call back number: [phone]    Requested Prescriptions:   Requested Prescriptions     Pending Prescriptions Disp Refills   • LORazepam (ATIVAN) 0.5 MG tablet 45 tablet 0     Sig: Take 1 tablet by mouth 2 (Two) Times a Day As Needed. for anxiety        Pharmacy where request should be sent: [facility] #[id number] [address] [phone] [facility] [phone] FX     Additional details provided by patient: THE PATIENT IS CURRENTLY OUT OF THIS MEDICATION. SHE HAS AN APPOINTMENT SET FOR 04/11/2022 FOR MED REFILLS.     Does the patient have less than a 3 day supply:  [x] Yes  [] No    Mich Smith Rep   03/28/22 12:41 EDT

## 2022-04-11 ENCOUNTER — OFFICE VISIT (OUTPATIENT)
Dept: FAMILY MEDICINE CLINIC | Facility: CLINIC | Age: 52
End: 2022-04-11

## 2022-04-11 VITALS
SYSTOLIC BLOOD PRESSURE: 122 MMHG | BODY MASS INDEX: 36.22 KG/M2 | HEART RATE: 52 BPM | OXYGEN SATURATION: 98 % | DIASTOLIC BLOOD PRESSURE: 76 MMHG | WEIGHT: 217.4 LBS | HEIGHT: 65 IN

## 2022-04-11 DIAGNOSIS — E53.8 B12 DEFICIENCY: Primary | ICD-10-CM

## 2022-04-11 DIAGNOSIS — F41.9 ANXIETY: ICD-10-CM

## 2022-04-11 DIAGNOSIS — I10 ESSENTIAL HYPERTENSION: ICD-10-CM

## 2022-04-11 DIAGNOSIS — Z12.11 COLON CANCER SCREENING: ICD-10-CM

## 2022-04-11 DIAGNOSIS — Z12.31 BREAST CANCER SCREENING BY MAMMOGRAM: ICD-10-CM

## 2022-04-11 DIAGNOSIS — R73.09 ABNORMAL GLUCOSE: ICD-10-CM

## 2022-04-11 DIAGNOSIS — J44.9 CHRONIC OBSTRUCTIVE PULMONARY DISEASE, UNSPECIFIED COPD TYPE: ICD-10-CM

## 2022-04-11 DIAGNOSIS — E03.9 HYPOTHYROIDISM, UNSPECIFIED TYPE: ICD-10-CM

## 2022-04-11 DIAGNOSIS — E55.9 VITAMIN D DEFICIENCY: ICD-10-CM

## 2022-04-11 DIAGNOSIS — J30.1 SEASONAL ALLERGIC RHINITIS DUE TO POLLEN: ICD-10-CM

## 2022-04-11 PROCEDURE — 36415 COLL VENOUS BLD VENIPUNCTURE: CPT | Performed by: NURSE PRACTITIONER

## 2022-04-11 PROCEDURE — 82306 VITAMIN D 25 HYDROXY: CPT | Performed by: NURSE PRACTITIONER

## 2022-04-11 PROCEDURE — 85027 COMPLETE CBC AUTOMATED: CPT | Performed by: NURSE PRACTITIONER

## 2022-04-11 PROCEDURE — 82607 VITAMIN B-12: CPT | Performed by: NURSE PRACTITIONER

## 2022-04-11 PROCEDURE — 83036 HEMOGLOBIN GLYCOSYLATED A1C: CPT | Performed by: NURSE PRACTITIONER

## 2022-04-11 PROCEDURE — 80061 LIPID PANEL: CPT | Performed by: NURSE PRACTITIONER

## 2022-04-11 PROCEDURE — 99214 OFFICE O/P EST MOD 30 MIN: CPT | Performed by: NURSE PRACTITIONER

## 2022-04-11 PROCEDURE — 84443 ASSAY THYROID STIM HORMONE: CPT | Performed by: NURSE PRACTITIONER

## 2022-04-11 PROCEDURE — 80053 COMPREHEN METABOLIC PANEL: CPT | Performed by: NURSE PRACTITIONER

## 2022-04-11 RX ORDER — CETIRIZINE HYDROCHLORIDE 10 MG/1
10 TABLET ORAL
Qty: 90 TABLET | Refills: 3 | Status: SHIPPED | OUTPATIENT
Start: 2022-04-11

## 2022-04-11 NOTE — PROGRESS NOTES
Chief Complaint  Hypothyroidism, Anxiety, COPD, Hypertension, and Follow-up (6 mo)    Subjective          Romina Camarillo presents to [facility] for   History of Present Illness     Hypothyroidism, stable on medication, denies symptoms of constipation, weight gain/loss, hot or cold intolerance, hair loss, abnormal heart rate and fatigue.     Anxiety, stable on sertraline, uses Ativan typically only at night as needed for sleep/anxiety.  Patient denies significant weight loss/gain, insomnia, hypersomnia, psychomotor agitation, psychomotor retardation, fatigue (loss of energy), feelings of worthlessness (guilt), impaired concentration (indecisiveness), thoughts of death or suicide.       COPD: The patient has been previously diagnosed with COPD. Symptoms include dyspnea, non-productive cough and wheezing, especially with exertion.   Symptoms have been unchanged since onset.  The pt is using inhalers as directed.     HTN, stable on meds and takes as directed, denies chest pain, headache, shortness of air, palpitations and swelling of extremities.     b12 deficiency on oral B12 daily    Vitamin D deficiency, on weekly vitamin D    Cologuard ordered 8/26/2021, patient has moved and did not receive the kit last year when initially ordered.        The following portions of the patient's history were reviewed and updated as appropriate: allergies, current medications, past family history, past medical history, past social history, past surgical history and problem list.    Past Medical History:   Diagnosis Date   • Anxiety 04/19/2017   • Depression    • Fatigue 02/06/2019   • Lung nodule 01/03/2018   • Obesity 04/19/2017   • Preventative health care 04/19/2017   • Upper respiratory infection 02/06/2019   • Visit for screening mammogram 02/06/2019     History reviewed. No pertinent surgical history.  History reviewed. No pertinent family history.  Social History     Tobacco Use   • Smoking  "status: Current Every Day Smoker     Packs/day: 0.50     Years: 0.00     Pack years: 0.00     Types: Cigarettes     Start date: 1998   • Smokeless tobacco: Never Used   Substance Use Topics   • Alcohol use: No       Current Outpatient Medications:   •  albuterol sulfate  (90 Base) MCG/ACT inhaler, INHALE 2 PUFFS BY MOUTH EVERY 4 HOURS AS NEEDED FOR WHEEZING OR SHORTNESS OF AIR, Disp: 18 g, Rfl: 5  •  cetirizine (zyrTEC) 10 MG tablet, Take 1 tablet by mouth every night at bedtime., Disp: 90 tablet, Rfl: 3  •  Cyanocobalamin (B-12) 1000 MCG capsule, Take 1 capsule by mouth Daily., Disp: 90 capsule, Rfl: 3  •  levothyroxine (SYNTHROID, LEVOTHROID) 50 MCG tablet, Take 1 tablet by mouth Daily., Disp: 90 tablet, Rfl: 1  •  LORazepam (ATIVAN) 0.5 MG tablet, Take 1 tablet by mouth 2 (Two) Times a Day As Needed for Anxiety. for anxiety, Disp: 45 tablet, Rfl: 0  •  nystatin (MYCOSTATIN) 123747 UNIT/GM powder, Apply  topically to the appropriate area as directed 3 (Three) Times a Day As Needed (rash)., Disp: 60 g, Rfl: 0  •  sertraline (ZOLOFT) 50 MG tablet, TAKE 1 TABLET BY MOUTH DAILY, Disp: 90 tablet, Rfl: 1  •  Symbicort 160-4.5 MCG/ACT inhaler, Inhale 1 puff 2 (Two) Times a Day., Disp: 10.2 g, Rfl: 5  •  vitamin D (ERGOCALCIFEROL) 1.25 MG (60259 UT) capsule capsule, Take 1 po daily for 3 days then once weekly on mondays (Patient taking differently: Take once weekly on mondays), Disp: 15 capsule, Rfl: 1    Objective   Vital Signs:   /76 (BP Location: Left arm, Patient Position: Sitting, Cuff Size: Adult)   Pulse 52   Ht 165.1 cm (65\")   Wt 98.6 kg (217 lb 6.4 oz)   SpO2 98%   BMI 36.18 kg/m²       Physical Exam  Vitals and nursing note reviewed.   Constitutional:       General: She is not in acute distress.     Appearance: She is well-developed. She is not diaphoretic.   HENT:      Nose: Rhinorrhea present.   Eyes:      Pupils: Pupils are equal, round, and reactive to light.   Neck:      Thyroid: No " thyromegaly.      Vascular: No JVD.   Cardiovascular:      Rate and Rhythm: Normal rate and regular rhythm.      Heart sounds: Normal heart sounds. No murmur heard.  Pulmonary:      Effort: Pulmonary effort is normal. No respiratory distress.      Breath sounds: Normal breath sounds. No wheezing or rhonchi.      Comments: Congestion clears with cough  Abdominal:      General: Bowel sounds are normal. There is no distension.      Palpations: Abdomen is soft.      Tenderness: There is no abdominal tenderness.   Musculoskeletal:         General: No tenderness. Normal range of motion.      Cervical back: Normal range of motion and neck supple.   Skin:     General: Skin is warm and dry.   Neurological:      Mental Status: She is alert and oriented to person, place, and time.      Sensory: No sensory deficit.   Psychiatric:         Behavior: Behavior normal.         Thought Content: Thought content normal.         Judgment: Judgment normal.          Result Review :     No visits with results within 7 Day(s) from this visit.   Latest known visit with results is:   Appointment on 02/16/2022   Component Date Value Ref Range Status   • Color 02/16/2022 Yellow  Yellow, Straw, Dark Yellow, Cristy Final   • Clarity, UA 02/16/2022 Slightly Cloudy (A) Clear Final   • Glucose, UA 02/16/2022 Negative  Negative, 1000 mg/dL (3+) mg/dL Final   • Bilirubin 02/16/2022 Negative  Negative Final   • Ketones, UA 02/16/2022 Negative  Negative Final   • Specific Gravity  02/16/2022 1.030  1.005 - 1.030 Final   • Blood, UA 02/16/2022 1+ (A) Negative Final   • pH, Urine 02/16/2022 6.0  5.0 - 8.0 Final   • Protein, POC 02/16/2022 Trace (A) Negative mg/dL Final   • Urobilinogen, UA 02/16/2022 Normal  Normal Final   • Leukocytes 02/16/2022 Trace (A) Negative Final   • Nitrite, UA 02/16/2022 Negative  Negative Final   • Urine Culture 02/16/2022 No growth   Final                       Assessment and Plan    Diagnoses and all orders for this  visit:    1. B12 deficiency (Primary)  -     Vitamin B12    2. Chronic obstructive pulmonary disease, unspecified COPD type (HCC)    3. Anxiety    4. Hypothyroidism, unspecified type  -     TSH    5. Essential hypertension  -     Lipid Panel  -     Comprehensive Metabolic Panel  -     CBC (No Diff)    6. Vitamin D deficiency  -     Vitamin D 25 Hydroxy    7. Breast cancer screening by mammogram  -     Mammo Screening Digital Tomosynthesis Bilateral With CAD; Future    8. Colon cancer screening  -     Cologuard - Stool, Per Rectum; Future    9. Abnormal glucose  -     Hemoglobin A1c    10. Seasonal allergic rhinitis due to pollen    Other orders  -     cetirizine (zyrTEC) 10 MG tablet; Take 1 tablet by mouth every night at bedtime.  Dispense: 90 tablet; Refill: 3      -Conditions stable, rf meds as above  -Patient to continue current medication regimen, will call when other medication refills are needed  -Previous labs reviewed, with elevated glucose and lipids  -Labs today, patient is fasting, she is improve diet and lost approximately 15 pounds  -Reordered Cologuard  -Ordered mammogram  -She declines all immunizations      I spent 30 minutes caring for Romina Camarillo on this date of service. This time includes time spent by me in the following activities: preparing for the visit, reviewing tests, performing a medically appropriate examination and/or evaluation , counseling and educating the patient/family/caregiver, ordering medications, tests, or procedures and documenting information in the medical record        Follow Up     Return in about 6 months (around 10/11/2022) for Medicare Wellness, anxiety, copd, HTN.  Patient was given instructions and counseling regarding her condition or for health maintenance advice. Please see specific information pulled into the AVS if appropriate.        Part of this note may be an electronic transcription/translation of spoken language to printed text using the Dragon  Dictation System

## 2022-04-11 NOTE — PROGRESS NOTES
Venipuncture Blood Specimen Collection  Venipuncture performed in rt arm by Marline Anderson MA with good hemostasis. Patient tolerated the procedure well without complications.   04/11/22   Marline Anderson MA

## 2022-04-12 LAB
25(OH)D3 SERPL-MCNC: 17.9 NG/ML (ref 30–100)
ALBUMIN SERPL-MCNC: 4.3 G/DL (ref 3.5–5.2)
ALBUMIN/GLOB SERPL: 1.5 G/DL
ALP SERPL-CCNC: 88 U/L (ref 39–117)
ALT SERPL W P-5'-P-CCNC: 12 U/L (ref 1–33)
ANION GAP SERPL CALCULATED.3IONS-SCNC: 13.5 MMOL/L (ref 5–15)
AST SERPL-CCNC: 17 U/L (ref 1–32)
BILIRUB SERPL-MCNC: 0.2 MG/DL (ref 0–1.2)
BUN SERPL-MCNC: 10 MG/DL (ref 6–20)
BUN/CREAT SERPL: 14.9 (ref 7–25)
CALCIUM SPEC-SCNC: 9.2 MG/DL (ref 8.6–10.5)
CHLORIDE SERPL-SCNC: 106 MMOL/L (ref 98–107)
CHOLEST SERPL-MCNC: 207 MG/DL (ref 0–200)
CO2 SERPL-SCNC: 22.5 MMOL/L (ref 22–29)
CREAT SERPL-MCNC: 0.67 MG/DL (ref 0.57–1)
DEPRECATED RDW RBC AUTO: 44.5 FL (ref 37–54)
EGFRCR SERPLBLD CKD-EPI 2021: 104.7 ML/MIN/1.73
ERYTHROCYTE [DISTWIDTH] IN BLOOD BY AUTOMATED COUNT: 12.8 % (ref 12.3–15.4)
GLOBULIN UR ELPH-MCNC: 2.8 GM/DL
GLUCOSE SERPL-MCNC: 107 MG/DL (ref 65–99)
HBA1C MFR BLD: 5.5 % (ref 3.5–5.6)
HCT VFR BLD AUTO: 43.7 % (ref 34–46.6)
HDLC SERPL-MCNC: 50 MG/DL (ref 40–60)
HGB BLD-MCNC: 14.9 G/DL (ref 12–15.9)
LDLC SERPL CALC-MCNC: 143 MG/DL (ref 0–100)
LDLC/HDLC SERPL: 2.82 {RATIO}
MCH RBC QN AUTO: 32.5 PG (ref 26.6–33)
MCHC RBC AUTO-ENTMCNC: 34.1 G/DL (ref 31.5–35.7)
MCV RBC AUTO: 95.2 FL (ref 79–97)
PLATELET # BLD AUTO: 281 10*3/MM3 (ref 140–450)
PMV BLD AUTO: 11.4 FL (ref 6–12)
POTASSIUM SERPL-SCNC: 4 MMOL/L (ref 3.5–5.2)
PROT SERPL-MCNC: 7.1 G/DL (ref 6–8.5)
RBC # BLD AUTO: 4.59 10*6/MM3 (ref 3.77–5.28)
SODIUM SERPL-SCNC: 142 MMOL/L (ref 136–145)
TRIGL SERPL-MCNC: 79 MG/DL (ref 0–150)
TSH SERPL DL<=0.05 MIU/L-ACNC: 8.46 UIU/ML (ref 0.27–4.2)
VIT B12 BLD-MCNC: 781 PG/ML (ref 211–946)
VLDLC SERPL-MCNC: 14 MG/DL (ref 5–40)
WBC NRBC COR # BLD: 6.89 10*3/MM3 (ref 3.4–10.8)

## 2022-04-13 ENCOUNTER — TELEPHONE (OUTPATIENT)
Dept: FAMILY MEDICINE CLINIC | Facility: CLINIC | Age: 52
End: 2022-04-13

## 2022-04-13 NOTE — TELEPHONE ENCOUNTER
Caller: Romina Camarillo    Relationship: Self    Best call back number: [phone]    What test was performed: 04/11/2022    When was the test performed: LABS     Where was the test performed: OFFICE     Additional notes: PATIENT WAS CALLING REGARDING HER TEST RESULTS. PATIENT WAS CALLING BECAUSE SHE SAW IN HER RESULTS THAT HER TSH LEVEL HAD CHANGED DRASTICALLY FROM HER LAST LAB RESULTS. PATIENT WANTED TO DISCUSS THIS AND SEE WHAT COULD BE CAUSING THAT. PATIENT STATED THAT SHE WAS TAKING A  HALF TABLET OF HER THYROID MEDICATION AND THEN SHE STARTING TAKING THE WHOLE TABLET. PATIENT WANTED TO KNOW IF THAT COULD BE WHY IT IS ELEVATED. PLEASE ADVISE.

## 2022-04-14 DIAGNOSIS — J44.9 CHRONIC OBSTRUCTIVE PULMONARY DISEASE, UNSPECIFIED COPD TYPE: ICD-10-CM

## 2022-04-14 DIAGNOSIS — E03.9 HYPOTHYROIDISM, UNSPECIFIED TYPE: ICD-10-CM

## 2022-04-14 RX ORDER — ERGOCALCIFEROL 1.25 MG/1
CAPSULE ORAL
Qty: 15 CAPSULE | Refills: 1 | Status: SHIPPED | OUTPATIENT
Start: 2022-04-14 | End: 2022-10-12 | Stop reason: SDUPTHER

## 2022-04-14 RX ORDER — LEVOTHYROXINE SODIUM 0.07 MG/1
75 TABLET ORAL DAILY
Qty: 90 TABLET | Refills: 1 | Status: SHIPPED | OUTPATIENT
Start: 2022-04-14 | End: 2022-10-14 | Stop reason: SDUPTHER

## 2022-04-15 RX ORDER — BUDESONIDE AND FORMOTEROL FUMARATE DIHYDRATE 160; 4.5 UG/1; UG/1
AEROSOL RESPIRATORY (INHALATION)
Qty: 10.2 G | Refills: 5 | Status: SHIPPED | OUTPATIENT
Start: 2022-04-15 | End: 2022-10-12 | Stop reason: SDUPTHER

## 2022-05-16 DIAGNOSIS — F41.9 ANXIETY: ICD-10-CM

## 2022-05-16 RX ORDER — LORAZEPAM 0.5 MG/1
0.5 TABLET ORAL 2 TIMES DAILY PRN
Qty: 45 TABLET | Refills: 0 | Status: SHIPPED | OUTPATIENT
Start: 2022-05-16 | End: 2022-07-01 | Stop reason: SDUPTHER

## 2022-06-07 ENCOUNTER — APPOINTMENT (OUTPATIENT)
Dept: MAMMOGRAPHY | Facility: HOSPITAL | Age: 52
End: 2022-06-07

## 2022-07-01 DIAGNOSIS — F41.9 ANXIETY: ICD-10-CM

## 2022-07-01 RX ORDER — LORAZEPAM 0.5 MG/1
0.5 TABLET ORAL 2 TIMES DAILY PRN
Qty: 45 TABLET | Refills: 0 | Status: SHIPPED | OUTPATIENT
Start: 2022-07-01 | End: 2022-08-24

## 2022-07-01 NOTE — TELEPHONE ENCOUNTER
.    Caller: Romina Camarillo    Relationship: Self    Best call back number: [phone]  Requested Prescriptions:   Requested Prescriptions     Pending Prescriptions Disp Refills   • LORazepam (ATIVAN) 0.5 MG tablet 45 tablet 0     Sig: Take 1 tablet by mouth 2 (Two) Times a Day As Needed for Anxiety. for anxiety        Pharmacy where request should be sent: [facility] #[id number] [place] [address] [address] AT [address] [phone] [facility] [phone] FX         Does the patient have less than a 3 day supply:  [x] Yes  [] No    Mich Caba Rep   07/01/22 10:38 EDT

## 2022-07-22 ENCOUNTER — TELEPHONE (OUTPATIENT)
Dept: FAMILY MEDICINE CLINIC | Facility: CLINIC | Age: 52
End: 2022-07-22

## 2022-07-22 NOTE — TELEPHONE ENCOUNTER
Patient notified and indicated understanding. Also said she had begun her allergy medication again to see if that would assist.

## 2022-07-22 NOTE — TELEPHONE ENCOUNTER
Pt called in this morning with symptoms of bilateral ear blockage, sore throat,  bad dry cough. Denies fever or nasal congestion. Took at home COVID test which came back negative. She was wondering if there was anything that could be called in for her or suggestions of what to take OTC. I did refer her to  or immediate care clinics and she said she would check them out if her symptoms became worse. She wanted to see what ou suggested first. Please advise.

## 2022-07-22 NOTE — TELEPHONE ENCOUNTER
Recommend treat the symptoms with Mucinex DM or DayQuil/NyQuil, Tylenol/ibuprofen as needed for body aches or fever and push fluids

## 2022-08-24 DIAGNOSIS — F41.9 ANXIETY: ICD-10-CM

## 2022-08-24 RX ORDER — LORAZEPAM 0.5 MG/1
TABLET ORAL
Qty: 45 TABLET | Refills: 0 | Status: SHIPPED | OUTPATIENT
Start: 2022-08-24 | End: 2022-10-05 | Stop reason: SDUPTHER

## 2022-08-30 ENCOUNTER — TELEPHONE (OUTPATIENT)
Dept: FAMILY MEDICINE CLINIC | Facility: CLINIC | Age: 52
End: 2022-08-30

## 2022-08-30 DIAGNOSIS — E03.9 HYPOTHYROIDISM, UNSPECIFIED TYPE: ICD-10-CM

## 2022-10-05 DIAGNOSIS — F41.9 ANXIETY: ICD-10-CM

## 2022-10-05 NOTE — TELEPHONE ENCOUNTER
Caller: Romina Camarillo    Relationship: Self    Best call back number: [phone] (H)    Requested Prescriptions:   Requested Prescriptions     Pending Prescriptions Disp Refills   • albuterol sulfate  (90 Base) MCG/ACT inhaler 18 g 5     Sig: Inhale 2 puffs Every 4 (Four) Hours As Needed for Wheezing or Shortness of Air.   • LORazepam (ATIVAN) 0.5 MG tablet 45 tablet 0     Sig: Take 1 tablet by mouth 2 (Two) Times a Day As Needed. for anxiety        Pharmacy where request should be sent: [facility] #[id number] [place], [place] - [address] AT [address] [phone] [facility] [phone]      Additional details provided by patient: PATIENT IS COMPLETELY OUT ALBUTEROL INHALER AND NEEDS REFILLED ASAP, PATIENT HAS 4 LORAZEPAM AND WILL RUN OUT BEFORE HER APPT ON 10/12/2022 AND IS ASKING IF THIS CAN ALSO BE REFILLED ASAP, PLEASE ADVISE PATIENT IF THIS CAN BE REFILLED ASAP    Does the patient have less than a 3 day supply:  [x] Yes  [] No    Mich Iglesias Rep   10/05/22 13:08 EDT

## 2022-10-06 ENCOUNTER — TELEPHONE (OUTPATIENT)
Dept: FAMILY MEDICINE CLINIC | Facility: CLINIC | Age: 52
End: 2022-10-06

## 2022-10-06 RX ORDER — AMOXICILLIN 500 MG/1
500 CAPSULE ORAL 2 TIMES DAILY
Qty: 20 CAPSULE | Refills: 0 | Status: SHIPPED | OUTPATIENT
Start: 2022-10-06 | End: 2022-10-16

## 2022-10-06 NOTE — TELEPHONE ENCOUNTER
Pt notified and she expressed understanding. She is scheduled with the dentist, but it is a week out. She asked that we send in the antibiotic and she will plan to keep her scheduled dental appt.

## 2022-10-06 NOTE — TELEPHONE ENCOUNTER
Caller: Romina Camarillo     Relationship: SELF     Best call back number: [phone]    What is your medical concern? TOOTHACHE FROM BROKEN TOOTH, CAN'T GET INTO DENTIST. WANTS TO KNOW IF SHE CAN GET AN ANTIBIOTIC.     How long has this issue been going on? ABOUT A MONTH     Is your provider already aware of this issue? NO     Have you been treated for this issue? NO    PHARMACY:[facility] #[id number] [place] [address] [address] [address] AT [address] [place] [facility] [phone] [facility] [phone]   [phone]

## 2022-10-06 NOTE — TELEPHONE ENCOUNTER
Is patient scheduled to be seen by dentist?  An antibiotic would only treat an infection due to decay or abscess but it will continue to occur if the source of the problem is not fixed.  I can send in a one-time antibiotic but ultimately she needs to schedule with dentistry.

## 2022-10-10 RX ORDER — ALBUTEROL SULFATE 90 UG/1
2 AEROSOL, METERED RESPIRATORY (INHALATION) EVERY 4 HOURS PRN
Qty: 18 G | Refills: 5 | Status: SHIPPED | OUTPATIENT
Start: 2022-10-10

## 2022-10-10 RX ORDER — LORAZEPAM 0.5 MG/1
0.5 TABLET ORAL 2 TIMES DAILY PRN
Qty: 45 TABLET | Refills: 0 | Status: SHIPPED | OUTPATIENT
Start: 2022-10-10 | End: 2022-11-30 | Stop reason: SDUPTHER

## 2022-10-12 ENCOUNTER — OFFICE VISIT (OUTPATIENT)
Dept: FAMILY MEDICINE CLINIC | Facility: CLINIC | Age: 52
End: 2022-10-12

## 2022-10-12 VITALS
HEIGHT: 65 IN | HEART RATE: 62 BPM | DIASTOLIC BLOOD PRESSURE: 68 MMHG | BODY MASS INDEX: 34.82 KG/M2 | OXYGEN SATURATION: 98 % | SYSTOLIC BLOOD PRESSURE: 118 MMHG | WEIGHT: 209 LBS

## 2022-10-12 DIAGNOSIS — E03.9 HYPOTHYROIDISM, UNSPECIFIED TYPE: ICD-10-CM

## 2022-10-12 DIAGNOSIS — J44.9 CHRONIC OBSTRUCTIVE PULMONARY DISEASE, UNSPECIFIED COPD TYPE: ICD-10-CM

## 2022-10-12 DIAGNOSIS — E55.9 VITAMIN D DEFICIENCY: ICD-10-CM

## 2022-10-12 DIAGNOSIS — S02.5XXA CLOSED FRACTURE OF TOOTH, INITIAL ENCOUNTER: ICD-10-CM

## 2022-10-12 DIAGNOSIS — F41.9 ANXIETY: ICD-10-CM

## 2022-10-12 DIAGNOSIS — Z12.11 COLON CANCER SCREENING: ICD-10-CM

## 2022-10-12 DIAGNOSIS — Z00.00 PREVENTATIVE HEALTH CARE: ICD-10-CM

## 2022-10-12 DIAGNOSIS — E53.8 B12 DEFICIENCY: Primary | ICD-10-CM

## 2022-10-12 PROCEDURE — 84443 ASSAY THYROID STIM HORMONE: CPT | Performed by: NURSE PRACTITIONER

## 2022-10-12 PROCEDURE — 80061 LIPID PANEL: CPT | Performed by: NURSE PRACTITIONER

## 2022-10-12 PROCEDURE — 99214 OFFICE O/P EST MOD 30 MIN: CPT | Performed by: NURSE PRACTITIONER

## 2022-10-12 PROCEDURE — 36415 COLL VENOUS BLD VENIPUNCTURE: CPT | Performed by: NURSE PRACTITIONER

## 2022-10-12 PROCEDURE — 82306 VITAMIN D 25 HYDROXY: CPT | Performed by: NURSE PRACTITIONER

## 2022-10-12 PROCEDURE — 80053 COMPREHEN METABOLIC PANEL: CPT | Performed by: NURSE PRACTITIONER

## 2022-10-12 PROCEDURE — 85027 COMPLETE CBC AUTOMATED: CPT | Performed by: NURSE PRACTITIONER

## 2022-10-12 RX ORDER — BUDESONIDE AND FORMOTEROL FUMARATE DIHYDRATE 160; 4.5 UG/1; UG/1
1 AEROSOL RESPIRATORY (INHALATION) 2 TIMES DAILY
Qty: 10.2 G | Refills: 5 | Status: SHIPPED | OUTPATIENT
Start: 2022-10-12

## 2022-10-12 RX ORDER — ERGOCALCIFEROL 1.25 MG/1
CAPSULE ORAL
Qty: 15 CAPSULE | Refills: 1 | Status: SHIPPED | OUTPATIENT
Start: 2022-10-12

## 2022-10-12 NOTE — PROGRESS NOTES
Venipuncture Blood Specimen Collection  Venipuncture performed in the right arm by Randi Sood MA with good hemostasis. Patient tolerated the procedure well without complications.   10/12/22   Randi Sood MA

## 2022-10-12 NOTE — PROGRESS NOTES
Chief Complaint  Chief Complaint   Patient presents with   • Follow-up     6 month follow up med review/hypothyroidism           Subjective          Romina Camarillo presents to [facility] for   History of Present Illness     Hypothyroidism, previous TSH hypoactive, levothyroxine titrated to 75 mcg, denies symptoms of constipation, weight gain/loss, hot or cold intolerance, hair loss, abnormal heart rate and fatigue.     COPD: The patient has been previously diagnosed with COPD. Symptoms include dyspnea, non-productive cough and wheezing, especially with exertion.   Symptoms have been unchanged since onset.  The pt is using Symbicort and albuterol inhalers as directed.     Anxiety, stable on sertraline and using lorazepam only as needed at night.  Patient denies significant weight loss/gain, insomnia, hypersomnia, psychomotor agitation, psychomotor retardation, fatigue (loss of energy), feelings of worthlessness (guilt), impaired concentration (indecisiveness), thoughts of death or suicide.       Vitamin D and B12 deficiencies, she did not receive a refill on vitamin D, has not been taking it for a month.  She does continue B12 OTC     Mammogram and Cologuard ordered at last visit, have not been completed.  Patient moved and reports she did not receive the Cologuard kit      The following portions of the patient's history were reviewed and updated as appropriate: allergies, current medications, past family history, past medical history, past social history, past surgical history and problem list.    Past Medical History:   Diagnosis Date   • Anxiety 04/19/2017   • Depression    • Fatigue 02/06/2019   • Lung nodule 01/03/2018   • Obesity 04/19/2017   • Preventative health care 04/19/2017   • Upper respiratory infection 02/06/2019   • Visit for screening mammogram 02/06/2019     History reviewed. No pertinent surgical history.  History reviewed. No pertinent family history.  Social  "History     Tobacco Use   • Smoking status: Every Day     Packs/day: 0.50     Years: 0.00     Pack years: 0.00     Types: Cigarettes     Start date: 1998   • Smokeless tobacco: Never   Substance Use Topics   • Alcohol use: No       Current Outpatient Medications:   •  albuterol sulfate  (90 Base) MCG/ACT inhaler, Inhale 2 puffs Every 4 (Four) Hours As Needed for Wheezing or Shortness of Air., Disp: 18 g, Rfl: 5  •  amoxicillin (AMOXIL) 500 MG capsule, Take 1 capsule by mouth 2 (Two) Times a Day for 10 days., Disp: 20 capsule, Rfl: 0  •  cetirizine (zyrTEC) 10 MG tablet, Take 1 tablet by mouth every night at bedtime., Disp: 90 tablet, Rfl: 3  •  Cyanocobalamin (B-12) 1000 MCG capsule, Take 1 capsule by mouth Daily., Disp: 90 capsule, Rfl: 3  •  levothyroxine (SYNTHROID, LEVOTHROID) 75 MCG tablet, Take 1 tablet by mouth Daily., Disp: 90 tablet, Rfl: 1  •  LORazepam (ATIVAN) 0.5 MG tablet, Take 1 tablet by mouth 2 (Two) Times a Day As Needed for Anxiety. for anxiety, Disp: 45 tablet, Rfl: 0  •  nystatin (MYCOSTATIN) 278647 UNIT/GM powder, Apply  topically to the appropriate area as directed 3 (Three) Times a Day As Needed (rash)., Disp: 60 g, Rfl: 0  •  sertraline (ZOLOFT) 50 MG tablet, Take 1 tablet by mouth Daily., Disp: 90 tablet, Rfl: 1  •  Symbicort 160-4.5 MCG/ACT inhaler, Inhale 1 puff 2 (Two) Times a Day., Disp: 10.2 g, Rfl: 5  •  vitamin D (ERGOCALCIFEROL) 1.25 MG (89488 UT) capsule capsule, Take once weekly on mondays, Disp: 15 capsule, Rfl: 1    Objective   Vital Signs:   /68 (BP Location: Left arm, Patient Position: Sitting, Cuff Size: Adult)   Pulse 62   Ht 165.1 cm (65\")   Wt 94.8 kg (209 lb)   SpO2 98%   BMI 34.78 kg/m²           Physical Exam  Vitals and nursing note reviewed.   Constitutional:       General: She is not in acute distress.     Appearance: She is well-developed. She is not diaphoretic.   Eyes:      Pupils: Pupils are equal, round, and reactive to light.   Neck:      " Thyroid: No thyromegaly.      Vascular: No JVD.   Cardiovascular:      Rate and Rhythm: Normal rate and regular rhythm.      Heart sounds: Normal heart sounds. No murmur heard.  Pulmonary:      Effort: Pulmonary effort is normal. No respiratory distress.      Breath sounds: Wheezing (Scattered, clears with cough) present. No rhonchi.      Comments: Prolonged expiration  Abdominal:      General: Bowel sounds are normal. There is no distension.      Palpations: Abdomen is soft.      Tenderness: There is no abdominal tenderness.   Musculoskeletal:         General: No swelling or tenderness. Normal range of motion.      Cervical back: Normal range of motion and neck supple.   Skin:     General: Skin is warm and dry.   Neurological:      Mental Status: She is alert and oriented to person, place, and time.      Sensory: No sensory deficit.   Psychiatric:         Mood and Affect: Mood normal.         Behavior: Behavior normal.         Thought Content: Thought content normal.         Judgment: Judgment normal.          Result Review :     No visits with results within 7 Day(s) from this visit.   Latest known visit with results is:   Office Visit on 04/11/2022   Component Date Value Ref Range Status   • 25 Hydroxy, Vitamin D 04/11/2022 17.9 (L)  30.0 - 100.0 ng/ml Final   • Vitamin B-12 04/11/2022 781  211 - 946 pg/mL Final   • Total Cholesterol 04/11/2022 207 (H)  0 - 200 mg/dL Final   • Triglycerides 04/11/2022 79  0 - 150 mg/dL Final   • HDL Cholesterol 04/11/2022 50  40 - 60 mg/dL Final   • LDL Cholesterol  04/11/2022 143 (H)  0 - 100 mg/dL Final   • VLDL Cholesterol 04/11/2022 14  5 - 40 mg/dL Final   • LDL/HDL Ratio 04/11/2022 2.82   Final   • Glucose 04/11/2022 107 (H)  65 - 99 mg/dL Final   • BUN 04/11/2022 10  6 - 20 mg/dL Final   • Creatinine 04/11/2022 0.67  0.57 - 1.00 mg/dL Final   • Sodium 04/11/2022 142  136 - 145 mmol/L Final   • Potassium 04/11/2022 4.0  3.5 - 5.2 mmol/L Final   • Chloride 04/11/2022 106  98  - 107 mmol/L Final   • CO2 04/11/2022 22.5  22.0 - 29.0 mmol/L Final   • Calcium 04/11/2022 9.2  8.6 - 10.5 mg/dL Final   • Total Protein 04/11/2022 7.1  6.0 - 8.5 g/dL Final   • Albumin 04/11/2022 4.30  3.50 - 5.20 g/dL Final   • ALT (SGPT) 04/11/2022 12  1 - 33 U/L Final   • AST (SGOT) 04/11/2022 17  1 - 32 U/L Final   • Alkaline Phosphatase 04/11/2022 88  39 - 117 U/L Final   • Total Bilirubin 04/11/2022 0.2  0.0 - 1.2 mg/dL Final   • Globulin 04/11/2022 2.8  gm/dL Final   • A/G Ratio 04/11/2022 1.5  g/dL Final   • BUN/Creatinine Ratio 04/11/2022 14.9  7.0 - 25.0 Final   • Anion Gap 04/11/2022 13.5  5.0 - 15.0 mmol/L Final   • eGFR 04/11/2022 104.7  >60.0 mL/min/1.73 Final    National Kidney Foundation and American Society of Nephrology (ASN) Task Force recommended calculation based on the Chronic Kidney Disease Epidemiology Collaboration (CKD-EPI) equation refit without adjustment for race.   • WBC 04/11/2022 6.89  3.40 - 10.80 10*3/mm3 Final   • RBC 04/11/2022 4.59  3.77 - 5.28 10*6/mm3 Final   • Hemoglobin 04/11/2022 14.9  12.0 - 15.9 g/dL Final   • Hematocrit 04/11/2022 43.7  34.0 - 46.6 % Final   • MCV 04/11/2022 95.2  79.0 - 97.0 fL Final   • MCH 04/11/2022 32.5  26.6 - 33.0 pg Final   • MCHC 04/11/2022 34.1  31.5 - 35.7 g/dL Final   • RDW 04/11/2022 12.8  12.3 - 15.4 % Final   • RDW-SD 04/11/2022 44.5  37.0 - 54.0 fl Final   • MPV 04/11/2022 11.4  6.0 - 12.0 fL Final   • Platelets 04/11/2022 281  140 - 450 10*3/mm3 Final   • TSH 04/11/2022 8.460 (H)  0.270 - 4.200 uIU/mL Final   • Hemoglobin A1C 04/11/2022 5.5  3.5 - 5.6 % Final                  BMI is >= 30 and <35. (Class 1 Obesity). The following options were offered after discussion;: exercise counseling/recommendations and nutrition counseling/recommendations           Assessment and Plan    Diagnoses and all orders for this visit:    1. B12 deficiency (Primary)  Comments:  Continue daily B12 OTC    2. Anxiety  Comments:  stable on Zoloft q am and  Lorazepam qhs PRN.   Orders:  -     sertraline (ZOLOFT) 50 MG tablet; Take 1 tablet by mouth Daily.  Dispense: 90 tablet; Refill: 1    3. Chronic obstructive pulmonary disease, unspecified COPD type (HCC)  Comments:  Stable and improved with Symbicort daily and albuterol as needed   continue/refill Symbicort, patient has albuterol inhaler  Orders:  -     Symbicort 160-4.5 MCG/ACT inhaler; Inhale 1 puff 2 (Two) Times a Day.  Dispense: 10.2 g; Refill: 5    4. Hypothyroidism, unspecified type  Comments:  TSH today, continue levothyroxine, titrate/adjust as needed  Orders:  -     TSH    5. Colon cancer screening  -     Cologuard - Stool, Per Rectum; Future    6. Vitamin D deficiency  Comments:  Refill and recommend resume vitamin D  Recheck level today  Orders:  -     Vitamin D 25 Hydroxy    7. Preventative health care  Comments:  Labs today as ordered, will notify results  Declines all immunizations  Patient provided number to schedule mammogram  Cologuard reordered to new address  Orders:  -     Lipid Panel  -     Comprehensive Metabolic Panel  -     CBC (No Diff)    8. Closed fracture of tooth, initial encounter  Comments:  Continue and complete amoxicillin  Follow-up with dentist next week    Other orders  -     vitamin D (ERGOCALCIFEROL) 1.25 MG (92253 UT) capsule capsule; Take once weekly on mondays  Dispense: 15 capsule; Refill: 1        I spent 30 minutes caring for Romina Camarillo on this date of service. This time includes time spent by me in the following activities: preparing for the visit, reviewing tests, performing a medically appropriate examination and/or evaluation , counseling and educating the patient/family/caregiver, ordering medications, tests, or procedures and documenting information in the medical record        Follow Up     Return in about 6 months (around 4/12/2023) for Recheck, Medicare Wellness.  Patient was given instructions and counseling regarding her condition or for health  maintenance advice. Please see specific information pulled into the AVS if appropriate.        Part of this note may be an electronic transcription/translation of spoken language to printed text using the Dragon Dictation System

## 2022-10-13 LAB
25(OH)D3 SERPL-MCNC: 26.3 NG/ML (ref 30–100)
ALBUMIN SERPL-MCNC: 4.6 G/DL (ref 3.5–5.2)
ALBUMIN/GLOB SERPL: 1.8 G/DL
ALP SERPL-CCNC: 82 U/L (ref 39–117)
ALT SERPL W P-5'-P-CCNC: 13 U/L (ref 1–33)
ANION GAP SERPL CALCULATED.3IONS-SCNC: 11.7 MMOL/L (ref 5–15)
AST SERPL-CCNC: 21 U/L (ref 1–32)
BILIRUB SERPL-MCNC: 0.3 MG/DL (ref 0–1.2)
BUN SERPL-MCNC: 15 MG/DL (ref 6–20)
BUN/CREAT SERPL: 19.5 (ref 7–25)
CALCIUM SPEC-SCNC: 9.6 MG/DL (ref 8.6–10.5)
CHLORIDE SERPL-SCNC: 102 MMOL/L (ref 98–107)
CHOLEST SERPL-MCNC: 220 MG/DL (ref 0–200)
CO2 SERPL-SCNC: 24.3 MMOL/L (ref 22–29)
CREAT SERPL-MCNC: 0.77 MG/DL (ref 0.57–1)
DEPRECATED RDW RBC AUTO: 42.2 FL (ref 37–54)
EGFRCR SERPLBLD CKD-EPI 2021: 92.4 ML/MIN/1.73
ERYTHROCYTE [DISTWIDTH] IN BLOOD BY AUTOMATED COUNT: 12.4 % (ref 12.3–15.4)
GLOBULIN UR ELPH-MCNC: 2.6 GM/DL
GLUCOSE SERPL-MCNC: 87 MG/DL (ref 65–99)
HCT VFR BLD AUTO: 44.3 % (ref 34–46.6)
HDLC SERPL-MCNC: 57 MG/DL (ref 40–60)
HGB BLD-MCNC: 15.4 G/DL (ref 12–15.9)
LDLC SERPL CALC-MCNC: 147 MG/DL (ref 0–100)
LDLC/HDLC SERPL: 2.55 {RATIO}
MCH RBC QN AUTO: 32.8 PG (ref 26.6–33)
MCHC RBC AUTO-ENTMCNC: 34.8 G/DL (ref 31.5–35.7)
MCV RBC AUTO: 94.3 FL (ref 79–97)
PLATELET # BLD AUTO: 293 10*3/MM3 (ref 140–450)
PMV BLD AUTO: 11.4 FL (ref 6–12)
POTASSIUM SERPL-SCNC: 4.3 MMOL/L (ref 3.5–5.2)
PROT SERPL-MCNC: 7.2 G/DL (ref 6–8.5)
RBC # BLD AUTO: 4.7 10*6/MM3 (ref 3.77–5.28)
SODIUM SERPL-SCNC: 138 MMOL/L (ref 136–145)
TRIGL SERPL-MCNC: 89 MG/DL (ref 0–150)
TSH SERPL DL<=0.05 MIU/L-ACNC: 2.9 UIU/ML (ref 0.27–4.2)
VLDLC SERPL-MCNC: 16 MG/DL (ref 5–40)
WBC NRBC COR # BLD: 7.48 10*3/MM3 (ref 3.4–10.8)

## 2022-10-14 DIAGNOSIS — E03.9 HYPOTHYROIDISM, UNSPECIFIED TYPE: ICD-10-CM

## 2022-10-14 RX ORDER — LEVOTHYROXINE SODIUM 0.07 MG/1
75 TABLET ORAL DAILY
Qty: 90 TABLET | Refills: 1 | Status: SHIPPED | OUTPATIENT
Start: 2022-10-14

## 2022-11-01 ENCOUNTER — TELEPHONE (OUTPATIENT)
Dept: FAMILY MEDICINE CLINIC | Facility: CLINIC | Age: 52
End: 2022-11-01

## 2022-11-01 RX ORDER — ONDANSETRON 4 MG/1
4 TABLET, FILM COATED ORAL EVERY 8 HOURS PRN
Qty: 30 TABLET | Refills: 0 | Status: SHIPPED | OUTPATIENT
Start: 2022-11-01

## 2022-11-01 NOTE — TELEPHONE ENCOUNTER
Caller: Romina Camarillo    Relationship: Self    Best call back number: [phone]  What is the best time to reach you: ANYTIME  Who are you requesting to speak with (clinical staff, provider,  specific staff member): CLINICAL STAFF  Do you know the name of the person who called: SELF    What was the call regarding: PATIENT CALLED AND STATED SHE TESTED POSITIVE FOR COVID -19 TODAY. PATIENT STATES SHE IS THROWING UP AND DRY HEAVING, AND COUGHING. PATIENT SAID TO PLEASE CALL. THANKS  Do you require a callback: YES

## 2022-11-30 DIAGNOSIS — F41.9 ANXIETY: ICD-10-CM

## 2022-11-30 RX ORDER — LORAZEPAM 0.5 MG/1
0.5 TABLET ORAL 2 TIMES DAILY PRN
Qty: 45 TABLET | Refills: 2 | Status: SHIPPED | OUTPATIENT
Start: 2022-11-30

## 2022-12-14 NOTE — PROGRESS NOTES
Pt came into the office to leave a urine sample due to having symptoms. Urine sample was collected and results given to provider.    Marline Anderson MA

## 2023-01-28 DIAGNOSIS — F41.9 ANXIETY: ICD-10-CM

## 2023-04-12 ENCOUNTER — OFFICE VISIT (OUTPATIENT)
Dept: FAMILY MEDICINE CLINIC | Facility: CLINIC | Age: 53
End: 2023-04-12
Payer: MEDICARE

## 2023-04-12 VITALS
HEIGHT: 65 IN | SYSTOLIC BLOOD PRESSURE: 128 MMHG | BODY MASS INDEX: 34.09 KG/M2 | HEART RATE: 59 BPM | WEIGHT: 204.6 LBS | DIASTOLIC BLOOD PRESSURE: 84 MMHG | OXYGEN SATURATION: 96 %

## 2023-04-12 DIAGNOSIS — E53.8 B12 DEFICIENCY: ICD-10-CM

## 2023-04-12 DIAGNOSIS — Z00.00 MEDICARE ANNUAL WELLNESS VISIT, SUBSEQUENT: Primary | ICD-10-CM

## 2023-04-12 DIAGNOSIS — F41.9 ANXIETY: ICD-10-CM

## 2023-04-12 DIAGNOSIS — E55.9 VITAMIN D DEFICIENCY: ICD-10-CM

## 2023-04-12 DIAGNOSIS — J44.1 CHRONIC OBSTRUCTIVE PULMONARY DISEASE WITH ACUTE EXACERBATION: ICD-10-CM

## 2023-04-12 DIAGNOSIS — J30.1 SEASONAL ALLERGIC RHINITIS DUE TO POLLEN: ICD-10-CM

## 2023-04-12 DIAGNOSIS — Z12.31 BREAST CANCER SCREENING BY MAMMOGRAM: ICD-10-CM

## 2023-04-12 DIAGNOSIS — E03.9 HYPOTHYROIDISM, UNSPECIFIED TYPE: ICD-10-CM

## 2023-04-12 DIAGNOSIS — Z12.11 COLON CANCER SCREENING: ICD-10-CM

## 2023-04-12 PROCEDURE — 85027 COMPLETE CBC AUTOMATED: CPT | Performed by: NURSE PRACTITIONER

## 2023-04-12 PROCEDURE — 82306 VITAMIN D 25 HYDROXY: CPT | Performed by: NURSE PRACTITIONER

## 2023-04-12 PROCEDURE — 80053 COMPREHEN METABOLIC PANEL: CPT | Performed by: NURSE PRACTITIONER

## 2023-04-12 PROCEDURE — 84443 ASSAY THYROID STIM HORMONE: CPT | Performed by: NURSE PRACTITIONER

## 2023-04-12 PROCEDURE — 80061 LIPID PANEL: CPT | Performed by: NURSE PRACTITIONER

## 2023-04-12 RX ORDER — LEVOTHYROXINE SODIUM 0.07 MG/1
75 TABLET ORAL DAILY
Qty: 90 TABLET | Refills: 1 | Status: SHIPPED | OUTPATIENT
Start: 2023-04-12

## 2023-04-12 RX ORDER — CETIRIZINE HYDROCHLORIDE 10 MG/1
10 TABLET ORAL
Qty: 90 TABLET | Refills: 3 | Status: SHIPPED | OUTPATIENT
Start: 2023-04-12

## 2023-04-12 RX ORDER — METHYLPREDNISOLONE 4 MG/1
TABLET ORAL
Qty: 21 TABLET | Refills: 0 | Status: SHIPPED | OUTPATIENT
Start: 2023-04-12

## 2023-04-12 RX ORDER — AZELASTINE 1 MG/ML
1 SPRAY, METERED NASAL 2 TIMES DAILY
Qty: 30 ML | Refills: 5 | Status: SHIPPED | OUTPATIENT
Start: 2023-04-12

## 2023-04-12 RX ORDER — ERGOCALCIFEROL 1.25 MG/1
CAPSULE ORAL
Qty: 15 CAPSULE | Refills: 1 | Status: SHIPPED | OUTPATIENT
Start: 2023-04-12

## 2023-04-12 RX ORDER — LORAZEPAM 0.5 MG/1
0.5 TABLET ORAL 2 TIMES DAILY PRN
Qty: 45 TABLET | Refills: 2 | Status: SHIPPED | OUTPATIENT
Start: 2023-04-12

## 2023-04-12 RX ORDER — MONTELUKAST SODIUM 10 MG/1
10 TABLET ORAL NIGHTLY
Qty: 90 TABLET | Refills: 1 | Status: SHIPPED | OUTPATIENT
Start: 2023-04-12

## 2023-04-12 NOTE — PROGRESS NOTES
The ABCs of the Annual Wellness Visit  Subsequent Medicare Wellness Visit    Subjective      Romina Camarillo is a 52 y.o. female who presents for a Subsequent Medicare Wellness Visit to follow-up on chronic conditions.    Hypothyroidism, patient is taking levothyroxine 75 mcg daily, denies symptoms of constipation, weight gain/loss, hot or cold intolerance, hair loss, abnormal heart rate and fatigue.      COPD, she reports cough, wheezing and severe allergy symptoms currently, nasal drainage and sputum production is clear.  Symptoms have been present for 4 days.  The pt is using Symbicort and albuterol inhalers as directed.      Anxiety, stable on sertraline and using lorazepam only as needed at night.  Patient denies significant weight loss/gain, insomnia, hypersomnia, psychomotor agitation, psychomotor retardation, fatigue (loss of energy), feelings of worthlessness (guilt), impaired concentration (indecisiveness), thoughts of death or suicide.       Vitamin D and B12 deficiencies, She is taking weekly vitamin D and daily B12 OTC      Mammogram and Cologuard ordered 2022 have not been completed.  Patient moved and reports she did not receive the Cologuard kit      The following portions of the patient's history were reviewed and   updated as appropriate: allergies, current medications, past family history, past medical history, past social history, past surgical history and problem list.    Compared to one year ago, the patient feels her physical   health is better.    Compared to one year ago, the patient feels her mental   health is better.    Recent Hospitalizations:  She was not admitted to the hospital during the last year.       Current Medical Providers:  Patient Care Team:  Kyung Ng APRN as PCP - General (Nurse Practitioner)    Outpatient Medications Prior to Visit   Medication Sig Dispense Refill   • albuterol sulfate  (90 Base) MCG/ACT inhaler Inhale 2 puffs Every 4 (Four) Hours As Needed  "for Wheezing or Shortness of Air. 18 g 5   • Cyanocobalamin (B-12) 1000 MCG capsule Take 1 capsule by mouth Daily. 90 capsule 3   • nystatin (MYCOSTATIN) 908056 UNIT/GM powder Apply  topically to the appropriate area as directed 3 (Three) Times a Day As Needed (rash). 60 g 0   • ondansetron (Zofran) 4 MG tablet Take 1 tablet by mouth Every 8 (Eight) Hours As Needed for Nausea or Vomiting. 30 tablet 0   • Symbicort 160-4.5 MCG/ACT inhaler Inhale 1 puff 2 (Two) Times a Day. 10.2 g 5   • cetirizine (zyrTEC) 10 MG tablet Take 1 tablet by mouth every night at bedtime. 90 tablet 3   • levothyroxine (SYNTHROID, LEVOTHROID) 75 MCG tablet Take 1 tablet by mouth Daily. 90 tablet 1   • LORazepam (ATIVAN) 0.5 MG tablet Take 1 tablet by mouth 2 (Two) Times a Day As Needed for Anxiety. for anxiety 45 tablet 2   • sertraline (ZOLOFT) 50 MG tablet Take 1 tablet by mouth Daily. 90 tablet 1   • vitamin D (ERGOCALCIFEROL) 1.25 MG (56420 UT) capsule capsule Take once weekly on mondays 15 capsule 1     No facility-administered medications prior to visit.       No opioid medication identified on active medication list. I have reviewed chart for other potential  high risk medication/s and harmful drug interactions in the elderly.          Aspirin is not on active medication list.  Aspirin use is not indicated based on review of current medical condition/s. Risk of harm outweighs potential benefits.  .    Patient Active Problem List   Diagnosis   • Anxiety   • Lung nodule     Advance Care Planning   Advance Care Planning     Advance Directive is not on file.  ACP discussion was held with the patient during this visit. Patient does not have an advance directive, declines further assistance.     Objective    Vitals:    04/12/23 1322   BP: 128/84   BP Location: Right arm   Patient Position: Sitting   Cuff Size: Large Adult   Pulse: 59   SpO2: 96%   Weight: 92.8 kg (204 lb 9.6 oz)   Height: 165.1 cm (65\")     Estimated body mass index is 34.05 " "kg/m² as calculated from the following:    Height as of this encounter: 165.1 cm (65\").    Weight as of this encounter: 92.8 kg (204 lb 9.6 oz).    BMI is >= 30 and <35. (Class 1 Obesity). The following options were offered after discussion;: exercise counseling/recommendations and nutrition counseling/recommendations    Physical Exam  Vitals and nursing note reviewed.   Constitutional:       General: She is not in acute distress.     Appearance: Normal appearance. She is well-developed. She is obese. She is not ill-appearing or diaphoretic.   HENT:      Right Ear: Tympanic membrane and ear canal normal.      Left Ear: Tympanic membrane and ear canal normal.      Nose: Rhinorrhea present.      Mouth/Throat:      Pharynx: Posterior oropharyngeal erythema (clear postnasal drip) present.   Eyes:      Pupils: Pupils are equal, round, and reactive to light.   Neck:      Thyroid: No thyromegaly.      Vascular: No JVD.   Cardiovascular:      Rate and Rhythm: Normal rate and regular rhythm.      Heart sounds: Normal heart sounds. No murmur heard.  Pulmonary:      Effort: Pulmonary effort is normal. No respiratory distress.      Breath sounds: Wheezing and rhonchi present.      Comments: Prolonged expiration  Abdominal:      General: Bowel sounds are normal. There is no distension.      Palpations: Abdomen is soft.      Tenderness: There is no abdominal tenderness.   Musculoskeletal:         General: No swelling or tenderness. Normal range of motion.      Cervical back: Normal range of motion and neck supple.   Skin:     General: Skin is warm and dry.   Neurological:      General: No focal deficit present.      Mental Status: She is alert and oriented to person, place, and time. Mental status is at baseline.      Sensory: No sensory deficit.   Psychiatric:         Mood and Affect: Mood normal.         Behavior: Behavior normal.         Thought Content: Thought content normal.         Judgment: Judgment normal.         Does " the patient have evidence of cognitive impairment?   No            HEALTH RISK ASSESSMENT    Smoking Status:  Social History     Tobacco Use   Smoking Status Every Day   • Packs/day: 0.50   • Years: 0.00   • Pack years: 0.00   • Types: Cigarettes   • Start date:    Smokeless Tobacco Never     Alcohol Consumption:  Social History     Substance and Sexual Activity   Alcohol Use No     Fall Risk Screen:    JORGE Fall Risk Assessment was completed, and patient is at LOW risk for falls.Assessment completed on:2023    Depression Screenin/12/2023     1:14 PM   PHQ-2/PHQ-9 Depression Screening   Little Interest or Pleasure in Doing Things 0-->not at all   Feeling Down, Depressed or Hopeless 0-->not at all   PHQ-9: Brief Depression Severity Measure Score 0       Health Habits and Functional and Cognitive Screenin/12/2023     1:00 PM   Functional & Cognitive Status   Do you have difficulty preparing food and eating? No   Do you have difficulty bathing yourself, getting dressed or grooming yourself? No   Do you have difficulty using the toilet? No   Do you have difficulty moving around from place to place? No   Do you have trouble with steps or getting out of a bed or a chair? No   Do you need help using the phone?  No   Are you deaf or do you have serious difficulty hearing?  No   Do you need help with transportation? Yes   Do you need help shopping? Yes   Do you need help preparing meals?  Yes   Do you need help with housework?  Yes   Do you need help with laundry? Yes   Do you need help taking your medications? No   Do you need help managing money? Yes   Do you ever drive or ride in a car without wearing a seat belt? No   Have you felt unusual stress, anger or loneliness in the last month? No   Who do you live with? Child   If you need help, do you have trouble finding someone available to you? No   Have you been bothered in the last four weeks by sexual problems? No   Do you have difficulty  concentrating, remembering or making decisions? Yes        ATTENTION  What is the year: correct  What is the month of the year: correct  What is the day of the week?: correct  What is the date?: correct  MEMORY  Repeat address three times, only score third attempt: [address] [place], [place]: 7  HOW MANY ANIMALS DID THE PATIENT NAME  Verbal Fluency -- Animal Names (0-25): 11-13  CLOCK DRAWING  Clock Drawing: All Correct  MEMORY RECALL  Tell me what you remember about that name and address we were repeating at the beginnin  ACE TOTAL SCORE  Total ACE Score - <25/30 strongly suggests cognitive impairment; <21/30 almost certainly shows dementia: 24      Age-appropriate Screening Schedule:  Refer to the list below for future screening recommendations based on patient's age, sex and/or medical conditions. Orders for these recommended tests are listed in the plan section. The patient has been provided with a written plan.    Health Maintenance   Topic Date Due   • MAMMOGRAM  Never done   • COLORECTAL CANCER SCREENING  Never done   • PAP SMEAR  2023   • Pneumococcal Vaccine 0-64 (1 - PCV) 2029 (Originally 1976)   • LIPID PANEL  10/12/2023   • ANNUAL WELLNESS VISIT  2024   • HEPATITIS C SCREENING  Completed   • COVID-19 Vaccine  Discontinued   • INFLUENZA VACCINE  Discontinued   • TDAP/TD VACCINES  Discontinued   • ZOSTER VACCINE  Discontinued                  CMS Preventative Services Quick Reference  Risk Factors Identified During Encounter:    Immunizations Discussed/Encouraged: Prevnar 20 (Pneumococcal 20-valent conjugate)  Tobacco Use/Dependance Risk (use dotphrase .tobaccocessation for documentation)    The above risks/problems have been discussed with the patient.  Pertinent information has been shared with the patient in the After Visit Summary.    Diagnoses and all orders for this visit:    1. Medicare annual wellness visit, subsequent  (Primary)  Comments:  Reordered mammogram and Cologuard  Labs today as ordered  pneumo 20 next visit  pt to schedule Pap smear  Orders:  -     Lipid Panel  -     Comprehensive Metabolic Panel  -     CBC (No Diff)    2. Anxiety  Comments:  stable on Zoloft q am. Lorazepam q hs and occasionally BID PRN, refilled  Orders:  -     sertraline (ZOLOFT) 50 MG tablet; Take 1 tablet by mouth Daily.  Dispense: 90 tablet; Refill: 1  -     LORazepam (ATIVAN) 0.5 MG tablet; Take 1 tablet by mouth 2 (Two) Times a Day As Needed for Anxiety. for anxiety  Dispense: 45 tablet; Refill: 2    3. Hypothyroidism, unspecified type  Comments:  recheck TSH today. cont/refill levothyroxine, will adjust if needed  Orders:  -     levothyroxine (SYNTHROID, LEVOTHROID) 75 MCG tablet; Take 1 tablet by mouth Daily.  Dispense: 90 tablet; Refill: 1  -     TSH    4. B12 deficiency  Comments:  Continue OTC B12    5. Vitamin D deficiency  Comments:  Continue weekly vitamin D  Orders:  -     vitamin D (ERGOCALCIFEROL) 1.25 MG (33754 UT) capsule capsule; Take once weekly on mondays  Dispense: 15 capsule; Refill: 1  -     Vitamin D,25-Hydroxy    6. Chronic obstructive pulmonary disease with acute exacerbation  Comments:  Continue Symbicort twice daily  Continue albuterol as needed  start medrol dose pack  Orders:  -     methylPREDNISolone (MEDROL) 4 MG dose pack; Take as directed on package instructions.  Dispense: 21 tablet; Refill: 0    7. Seasonal allergic rhinitis due to pollen  Comments:  Continue Zyrtec  Add Singulair for COPD and allergies  start Astelin nasal spray  Orders:  -     azelastine (ASTELIN) 0.1 % nasal spray; 1 spray into the nostril(s) as directed by provider 2 (Two) Times a Day. Use in each nostril as directed  Dispense: 30 mL; Refill: 5  -     montelukast (Singulair) 10 MG tablet; Take 1 tablet by mouth Every Night.  Dispense: 90 tablet; Refill: 1  -     cetirizine (zyrTEC) 10 MG tablet; Take 1 tablet by mouth every night at bedtime.   Dispense: 90 tablet; Refill: 3    8. Colon cancer screening  -     Cologuard - Stool, Per Rectum; Future    9. Breast cancer screening by mammogram  -     Mammo Screening Digital Tomosynthesis Bilateral With CAD; Future      Age appropriate preventative counseling provided, including healthy lifestyle modifications and exercise          Follow Up:     Return in about 6 months (around 10/12/2023), or if symptoms worsen or fail to improve in 1-2 days, for Recheck COPD, vit D. schedule pap.    Next Medicare Wellness visit to be scheduled in 1 year.      An After Visit Summary and PPPS were made available to the patient.        EMR Dragon transcription disclaimer:  Part of this note may be an electronic transcription/translation of spoken language to printed text using the Dragon Dictation System.

## 2023-04-12 NOTE — PROGRESS NOTES
Venipuncture Blood Specimen Collection  Venipuncture performed in right arm by Delaney Sahni MA with good hemostasis. Patient tolerated the procedure well without complications.   04/12/23   Delaney Sahni MA

## 2023-04-13 LAB
25(OH)D3 SERPL-MCNC: 36 NG/ML (ref 30–100)
ALBUMIN SERPL-MCNC: 4.2 G/DL (ref 3.5–5.2)
ALBUMIN/GLOB SERPL: 1.4 G/DL
ALP SERPL-CCNC: 79 U/L (ref 39–117)
ALT SERPL W P-5'-P-CCNC: 11 U/L (ref 1–33)
ANION GAP SERPL CALCULATED.3IONS-SCNC: 9 MMOL/L (ref 5–15)
AST SERPL-CCNC: 17 U/L (ref 1–32)
BILIRUB SERPL-MCNC: 0.3 MG/DL (ref 0–1.2)
BUN SERPL-MCNC: 12 MG/DL (ref 6–20)
BUN/CREAT SERPL: 17.4 (ref 7–25)
CALCIUM SPEC-SCNC: 9.1 MG/DL (ref 8.6–10.5)
CHLORIDE SERPL-SCNC: 110 MMOL/L (ref 98–107)
CHOLEST SERPL-MCNC: 198 MG/DL (ref 0–200)
CO2 SERPL-SCNC: 24 MMOL/L (ref 22–29)
CREAT SERPL-MCNC: 0.69 MG/DL (ref 0.57–1)
DEPRECATED RDW RBC AUTO: 41.2 FL (ref 37–54)
EGFRCR SERPLBLD CKD-EPI 2021: 103.3 ML/MIN/1.73
ERYTHROCYTE [DISTWIDTH] IN BLOOD BY AUTOMATED COUNT: 12.3 % (ref 12.3–15.4)
GLOBULIN UR ELPH-MCNC: 2.9 GM/DL
GLUCOSE SERPL-MCNC: 92 MG/DL (ref 65–99)
HCT VFR BLD AUTO: 42.6 % (ref 34–46.6)
HDLC SERPL-MCNC: 55 MG/DL (ref 40–60)
HGB BLD-MCNC: 14.9 G/DL (ref 12–15.9)
LDLC SERPL CALC-MCNC: 128 MG/DL (ref 0–100)
LDLC/HDLC SERPL: 2.31 {RATIO}
MCH RBC QN AUTO: 32.4 PG (ref 26.6–33)
MCHC RBC AUTO-ENTMCNC: 35 G/DL (ref 31.5–35.7)
MCV RBC AUTO: 92.6 FL (ref 79–97)
PLATELET # BLD AUTO: 313 10*3/MM3 (ref 140–450)
PMV BLD AUTO: 11.1 FL (ref 6–12)
POTASSIUM SERPL-SCNC: 3.8 MMOL/L (ref 3.5–5.2)
PROT SERPL-MCNC: 7.1 G/DL (ref 6–8.5)
RBC # BLD AUTO: 4.6 10*6/MM3 (ref 3.77–5.28)
SODIUM SERPL-SCNC: 143 MMOL/L (ref 136–145)
TRIGL SERPL-MCNC: 81 MG/DL (ref 0–150)
TSH SERPL DL<=0.05 MIU/L-ACNC: 3.74 UIU/ML (ref 0.27–4.2)
VLDLC SERPL-MCNC: 15 MG/DL (ref 5–40)
WBC NRBC COR # BLD: 7.4 10*3/MM3 (ref 3.4–10.8)

## 2023-05-15 DIAGNOSIS — F41.9 ANXIETY: ICD-10-CM

## 2023-06-06 DIAGNOSIS — E53.8 B12 DEFICIENCY: ICD-10-CM

## 2023-06-07 RX ORDER — LANOLIN ALCOHOL/MO/W.PET/CERES
CREAM (GRAM) TOPICAL
Qty: 100 TABLET | Refills: 2 | Status: SHIPPED | OUTPATIENT
Start: 2023-06-07

## 2023-09-13 DIAGNOSIS — F41.9 ANXIETY: ICD-10-CM

## 2023-09-13 NOTE — TELEPHONE ENCOUNTER
Caller: Romina Camarillo    Relationship: Self    Best call back number: [phone]    Requested Prescriptions:   Requested Prescriptions     Pending Prescriptions Disp Refills    LORazepam (ATIVAN) 0.5 MG tablet 45 tablet 2     Sig: Take 1 tablet by mouth 2 (Two) Times a Day As Needed for Anxiety. for anxiety        Pharmacy where request should be sent: [id number] - [address] AT [address] [phone] [facility] [phone]      Last office visit with prescribing clinician: 4/12/2023   Last telemedicine visit with prescribing clinician: Visit date not found   Next office visit with prescribing clinician: Visit date not found     Additional details provided by patient: OUT OF MEDICATION     Does the patient have less than a 3 day supply:  [x] Yes  [] No    Would you like a call back once the refill request has been completed: [] Yes [] No    If the office needs to give you a call back, can they leave a voicemail: [] Yes [] No    Mich Sawyer   09/13/23 12:19 EDT

## 2023-09-14 RX ORDER — LORAZEPAM 0.5 MG/1
0.5 TABLET ORAL 2 TIMES DAILY PRN
Qty: 45 TABLET | Refills: 2 | Status: SHIPPED | OUTPATIENT
Start: 2023-09-14

## 2023-09-15 DIAGNOSIS — J44.9 CHRONIC OBSTRUCTIVE PULMONARY DISEASE, UNSPECIFIED COPD TYPE: ICD-10-CM

## 2023-09-15 RX ORDER — BUDESONIDE AND FORMOTEROL FUMARATE DIHYDRATE 160; 4.5 UG/1; UG/1
1 AEROSOL RESPIRATORY (INHALATION) 2 TIMES DAILY
Qty: 10.2 G | Refills: 5 | Status: SHIPPED | OUTPATIENT
Start: 2023-09-15

## 2023-10-11 ENCOUNTER — TELEPHONE (OUTPATIENT)
Dept: FAMILY MEDICINE CLINIC | Facility: CLINIC | Age: 53
End: 2023-10-11
Payer: MEDICARE

## 2023-10-11 NOTE — TELEPHONE ENCOUNTER
Caller: Romina Camarillo    Relationship to patient: Self    Best call back number: [phone]     Chief complaint: EAR PAIN, COUGH, SORE THROAT     Type of visit: SAME DAY    Requested date: SOONEST AVAILABLE    Additional notes:ATTEMPTED TO WARM TRANSFER

## 2023-10-11 NOTE — TELEPHONE ENCOUNTER
Pt notified that we do not have a provider this afternoon and advised to go to urgent care. She expressed understanding.

## 2023-11-14 ENCOUNTER — OFFICE VISIT (OUTPATIENT)
Dept: FAMILY MEDICINE CLINIC | Facility: CLINIC | Age: 53
End: 2023-11-14
Payer: MEDICARE

## 2023-11-14 VITALS
BODY MASS INDEX: 36.06 KG/M2 | OXYGEN SATURATION: 98 % | RESPIRATION RATE: 18 BRPM | WEIGHT: 216.4 LBS | HEART RATE: 84 BPM | SYSTOLIC BLOOD PRESSURE: 110 MMHG | HEIGHT: 65 IN | TEMPERATURE: 98 F | DIASTOLIC BLOOD PRESSURE: 60 MMHG

## 2023-11-14 DIAGNOSIS — F32.9 REACTIVE DEPRESSION: Primary | ICD-10-CM

## 2023-11-14 DIAGNOSIS — E55.9 VITAMIN D DEFICIENCY: ICD-10-CM

## 2023-11-14 DIAGNOSIS — G47.09 OTHER INSOMNIA: ICD-10-CM

## 2023-11-14 DIAGNOSIS — R07.89 ATYPICAL CHEST PAIN: ICD-10-CM

## 2023-11-14 DIAGNOSIS — F41.9 ANXIETY: ICD-10-CM

## 2023-11-14 DIAGNOSIS — E03.9 HYPOTHYROIDISM, UNSPECIFIED TYPE: ICD-10-CM

## 2023-11-14 DIAGNOSIS — Z12.31 BREAST CANCER SCREENING BY MAMMOGRAM: ICD-10-CM

## 2023-11-14 RX ORDER — ERGOCALCIFEROL 1.25 MG/1
CAPSULE ORAL
Qty: 15 CAPSULE | Refills: 1 | Status: SHIPPED | OUTPATIENT
Start: 2023-11-14

## 2023-11-14 RX ORDER — LORAZEPAM 0.5 MG/1
0.5 TABLET ORAL 2 TIMES DAILY PRN
Qty: 45 TABLET | Refills: 2 | Status: SHIPPED | OUTPATIENT
Start: 2023-11-14

## 2023-11-14 RX ORDER — SERTRALINE HYDROCHLORIDE 100 MG/1
100 TABLET, FILM COATED ORAL DAILY
Qty: 90 TABLET | Refills: 1 | Status: SHIPPED | OUTPATIENT
Start: 2023-11-14

## 2023-11-14 NOTE — PROGRESS NOTES
Chief Complaint  Chief Complaint   Patient presents with    Sleep problems     Difficulty w staying asleep and falling asleep. Racing thoughts.            Subjective          Romina Camarillo presents to [facility] for   History of Present Illness    Patient presents today with complaints of insomnia as mentioned in chief complaint, anxiety is high, having racing thoughts, unable to shut her mind off at night to fall asleep.  She takes Zoloft daily and Ativan as needed for anxiety/depression.  She reports takes Ativan at night and helps her to fall asleep but wakes up after 2 to 3 hours and unable to fall back asleep.  She has a lot going on, she is stressed, her sister lives with her, her daughter and kids moved in, she is taking care of everyone.  She does report having 1 episode of chest pain, she was extremely anxious, after she calmed the pain resolved    COPD, asthma, allergy, stable on Singulair and Zyrtec, using Symbicort 2 puffs twice daily    Vitamin D deficiency, on weekly vitamin D    Hypothyroidism, stable on medication, denies symptoms of constipation, weight gain/loss, hot or cold intolerance, hair loss, abnormal heart rate and fatigue.         The following portions of the patient's history were reviewed and updated as appropriate: allergies, current medications, past family history, past medical history, past social history, past surgical history and problem list.    Past Medical History:   Diagnosis Date    Anxiety 04/19/2017    Depression     Fatigue 02/06/2019    Lung nodule 01/03/2018    Obesity 04/19/2017    Preventative health care 04/19/2017    Upper respiratory infection 02/06/2019    Visit for screening mammogram 02/06/2019     Past Surgical History:   Procedure Laterality Date    CHOLECYSTECTOMY      TUBAL ABDOMINAL LIGATION       Family History   Problem Relation Age of Onset    Diabetes Maternal Aunt     Diabetes Maternal Uncle      Social History  "    Tobacco Use    Smoking status: Every Day     Packs/day: 0.50     Years: 25.00     Additional pack years: 0.00     Total pack years: 12.50     Types: Cigarettes     Start date: 1998    Smokeless tobacco: Never   Substance Use Topics    Alcohol use: No       Current Outpatient Medications:     albuterol sulfate  (90 Base) MCG/ACT inhaler, Inhale 2 puffs Every 4 (Four) Hours As Needed for Wheezing or Shortness of Air., Disp: 18 g, Rfl: 5    budesonide-formoterol (Symbicort) 160-4.5 MCG/ACT inhaler, Inhale 1 puff 2 (Two) Times a Day., Disp: 10.2 g, Rfl: 5    cetirizine (zyrTEC) 10 MG tablet, Take 1 tablet by mouth every night at bedtime., Disp: 90 tablet, Rfl: 3    Cyanocobalamin (B-12) 1000 MCG capsule, Take 1 capsule by mouth Daily., Disp: 90 capsule, Rfl: 3    levothyroxine (SYNTHROID, LEVOTHROID) 75 MCG tablet, Take 1 tablet by mouth Daily., Disp: 90 tablet, Rfl: 1    LORazepam (ATIVAN) 0.5 MG tablet, Take 1 tablet by mouth 2 (Two) Times a Day As Needed for Anxiety. for anxiety, Disp: 45 tablet, Rfl: 2    montelukast (Singulair) 10 MG tablet, Take 1 tablet by mouth Every Night., Disp: 90 tablet, Rfl: 1    ondansetron (Zofran) 4 MG tablet, Take 1 tablet by mouth Every 8 (Eight) Hours As Needed for Nausea or Vomiting., Disp: 30 tablet, Rfl: 0    sertraline (ZOLOFT) 100 MG tablet, Take 1 tablet by mouth Daily., Disp: 90 tablet, Rfl: 1    vitamin D (ERGOCALCIFEROL) 1.25 MG (89028 UT) capsule capsule, Take once weekly on mondays, Disp: 15 capsule, Rfl: 1    Objective   Vital Signs:   /60 (BP Location: Left arm, Patient Position: Sitting, Cuff Size: Large Adult)   Pulse 84   Temp 98 °F (36.7 °C) (Temporal)   Resp 18   Ht 165.1 cm (65\")   Wt 98.2 kg (216 lb 6.4 oz)   SpO2 98% Comment: Room air  BMI 36.01 kg/m²           Physical Exam  Constitutional:       General: She is not in acute distress.     Appearance: Normal appearance. She is well-developed. She is not ill-appearing or diaphoretic.   HENT: "      Head: Normocephalic.   Eyes:      Conjunctiva/sclera: Conjunctivae normal.      Pupils: Pupils are equal, round, and reactive to light.   Neck:      Thyroid: No thyromegaly.      Vascular: No JVD.   Cardiovascular:      Rate and Rhythm: Normal rate and regular rhythm.      Heart sounds: Normal heart sounds. No murmur heard.  Pulmonary:      Effort: Pulmonary effort is normal. No respiratory distress.      Breath sounds: Normal breath sounds. No wheezing or rhonchi.   Abdominal:      General: Bowel sounds are normal. There is no distension.      Palpations: Abdomen is soft.      Tenderness: There is no abdominal tenderness.   Musculoskeletal:         General: No swelling or tenderness. Normal range of motion.      Cervical back: Normal range of motion and neck supple. No tenderness.   Lymphadenopathy:      Cervical: No cervical adenopathy.   Skin:     General: Skin is warm and dry.      Coloration: Skin is not jaundiced.      Findings: No erythema or rash.   Neurological:      General: No focal deficit present.      Mental Status: She is alert and oriented to person, place, and time. Mental status is at baseline.      Sensory: No sensory deficit.      Motor: No weakness.      Gait: Gait normal.   Psychiatric:         Mood and Affect: Mood normal.         Behavior: Behavior normal.         Thought Content: Thought content normal.         Judgment: Judgment normal.          Result Review :     No visits with results within 7 Day(s) from this visit.   Latest known visit with results is:   Office Visit on 04/12/2023   Component Date Value Ref Range Status    Total Cholesterol 04/12/2023 198  0 - 200 mg/dL Final    Triglycerides 04/12/2023 81  0 - 150 mg/dL Final    HDL Cholesterol 04/12/2023 55  40 - 60 mg/dL Final    LDL Cholesterol  04/12/2023 128 (H)  0 - 100 mg/dL Final    VLDL Cholesterol 04/12/2023 15  5 - 40 mg/dL Final    LDL/HDL Ratio 04/12/2023 2.31   Final    Glucose 04/12/2023 92  65 - 99 mg/dL Final     BUN 04/12/2023 12  6 - 20 mg/dL Final    Creatinine 04/12/2023 0.69  0.57 - 1.00 mg/dL Final    Sodium 04/12/2023 143  136 - 145 mmol/L Final    Potassium 04/12/2023 3.8  3.5 - 5.2 mmol/L Final    Chloride 04/12/2023 110 (H)  98 - 107 mmol/L Final    CO2 04/12/2023 24.0  22.0 - 29.0 mmol/L Final    Calcium 04/12/2023 9.1  8.6 - 10.5 mg/dL Final    Total Protein 04/12/2023 7.1  6.0 - 8.5 g/dL Final    Albumin 04/12/2023 4.2  3.5 - 5.2 g/dL Final    ALT (SGPT) 04/12/2023 11  1 - 33 U/L Final    AST (SGOT) 04/12/2023 17  1 - 32 U/L Final    Alkaline Phosphatase 04/12/2023 79  39 - 117 U/L Final    Total Bilirubin 04/12/2023 0.3  0.0 - 1.2 mg/dL Final    Globulin 04/12/2023 2.9  gm/dL Final    A/G Ratio 04/12/2023 1.4  g/dL Final    BUN/Creatinine Ratio 04/12/2023 17.4  7.0 - 25.0 Final    Anion Gap 04/12/2023 9.0  5.0 - 15.0 mmol/L Final    eGFR 04/12/2023 103.3  >60.0 mL/min/1.73 Final    WBC 04/12/2023 7.40  3.40 - 10.80 10*3/mm3 Final    RBC 04/12/2023 4.60  3.77 - 5.28 10*6/mm3 Final    Hemoglobin 04/12/2023 14.9  12.0 - 15.9 g/dL Final    Hematocrit 04/12/2023 42.6  34.0 - 46.6 % Final    MCV 04/12/2023 92.6  79.0 - 97.0 fL Final    MCH 04/12/2023 32.4  26.6 - 33.0 pg Final    MCHC 04/12/2023 35.0  31.5 - 35.7 g/dL Final    RDW 04/12/2023 12.3  12.3 - 15.4 % Final    RDW-SD 04/12/2023 41.2  37.0 - 54.0 fl Final    MPV 04/12/2023 11.1  6.0 - 12.0 fL Final    Platelets 04/12/2023 313  140 - 450 10*3/mm3 Final    TSH 04/12/2023 3.740  0.270 - 4.200 uIU/mL Final    25 Hydroxy, Vitamin D 04/12/2023 36.0  30.0 - 100.0 ng/ml Final                              Assessment and Plan    Diagnoses and all orders for this visit:    1. Reactive depression (Primary)  Comments:  Increase Zoloft to 100 mg daily    2. Atypical chest pain  Comments:  likely 2/2 anxiety    3. Other insomnia  Comments:  stress and anxiety related  ok to take ativan nightly and may take extra 1/2 tab prn.   rec try melatonin again also    4. Vitamin D  deficiency  Comments:  Continue weekly vitamin D  Orders:  -     vitamin D (ERGOCALCIFEROL) 1.25 MG (14651 UT) capsule capsule; Take once weekly on mondays  Dispense: 15 capsule; Refill: 1    5. Anxiety  Comments:  increase zoloft  ok to continue Lorazepam q hs and occasionally BID PRN, may take extra 1/2 tab if awakens through the night.  Orders:  -     sertraline (ZOLOFT) 100 MG tablet; Take 1 tablet by mouth Daily.  Dispense: 90 tablet; Refill: 1  -     LORazepam (ATIVAN) 0.5 MG tablet; Take 1 tablet by mouth 2 (Two) Times a Day As Needed for Anxiety. for anxiety  Dispense: 45 tablet; Refill: 2    6. Breast cancer screening by mammogram  -     Mammo Screening Digital Tomosynthesis Bilateral With CAD; Future    7. Hypothyroidism, unspecified type      Labs from earlier this year reviewed, stable.  We will plan to check yearly      I spent 30 minutes caring for Romina Camarillo on this date of service. This time includes time spent by me in the following activities: preparing for the visit, reviewing tests, performing a medically appropriate examination and/or evaluation , counseling and educating the patient/family/caregiver, ordering medications, tests, or procedures and documenting information in the medical record        Follow Up     Return in about 6 months (around 5/14/2024) for Medicare Wellness, Recheck.  Patient was given instructions and counseling regarding her condition or for health maintenance advice. Please see specific information pulled into the AVS if appropriate.        Part of this note may be an electronic transcription/translation of spoken language to printed text using the Dragon Dictation System

## 2024-01-23 DIAGNOSIS — E03.9 HYPOTHYROIDISM, UNSPECIFIED TYPE: ICD-10-CM

## 2024-01-24 RX ORDER — LEVOTHYROXINE SODIUM 0.07 MG/1
75 TABLET ORAL DAILY
Qty: 90 TABLET | Refills: 1 | Status: SHIPPED | OUTPATIENT
Start: 2024-01-24

## 2024-03-04 ENCOUNTER — OFFICE VISIT (OUTPATIENT)
Dept: FAMILY MEDICINE CLINIC | Facility: CLINIC | Age: 54
End: 2024-03-04
Payer: MEDICARE

## 2024-03-04 VITALS
DIASTOLIC BLOOD PRESSURE: 68 MMHG | TEMPERATURE: 97.2 F | HEART RATE: 68 BPM | RESPIRATION RATE: 18 BRPM | WEIGHT: 211.8 LBS | OXYGEN SATURATION: 96 % | BODY MASS INDEX: 35.29 KG/M2 | HEIGHT: 65 IN | SYSTOLIC BLOOD PRESSURE: 110 MMHG

## 2024-03-04 DIAGNOSIS — J44.9 CHRONIC OBSTRUCTIVE PULMONARY DISEASE, UNSPECIFIED COPD TYPE: ICD-10-CM

## 2024-03-04 DIAGNOSIS — K57.92 DIVERTICULITIS: Primary | ICD-10-CM

## 2024-03-04 DIAGNOSIS — E55.9 VITAMIN D DEFICIENCY: ICD-10-CM

## 2024-03-04 LAB
BILIRUB BLD-MCNC: NEGATIVE MG/DL
CLARITY, POC: CLEAR
COLOR UR: YELLOW
GLUCOSE UR STRIP-MCNC: NEGATIVE MG/DL
KETONES UR QL: NEGATIVE
LEUKOCYTE EST, POC: NEGATIVE
NITRITE UR-MCNC: NEGATIVE MG/ML
PH UR: 7.5 [PH] (ref 5–8)
PROT UR STRIP-MCNC: NEGATIVE MG/DL
RBC # UR STRIP: ABNORMAL /UL
SP GR UR: 1.01 (ref 1–1.03)
UROBILINOGEN UR QL: NORMAL

## 2024-03-04 PROCEDURE — 1160F RVW MEDS BY RX/DR IN RCRD: CPT | Performed by: NURSE PRACTITIONER

## 2024-03-04 PROCEDURE — 99214 OFFICE O/P EST MOD 30 MIN: CPT | Performed by: NURSE PRACTITIONER

## 2024-03-04 PROCEDURE — 81003 URINALYSIS AUTO W/O SCOPE: CPT | Performed by: NURSE PRACTITIONER

## 2024-03-04 PROCEDURE — 1159F MED LIST DOCD IN RCRD: CPT | Performed by: NURSE PRACTITIONER

## 2024-03-04 RX ORDER — ALBUTEROL SULFATE 90 UG/1
2 AEROSOL, METERED RESPIRATORY (INHALATION) EVERY 4 HOURS PRN
Qty: 18 G | Refills: 5 | Status: SHIPPED | OUTPATIENT
Start: 2024-03-04

## 2024-03-04 RX ORDER — DICYCLOMINE HYDROCHLORIDE 10 MG/1
10 CAPSULE ORAL
Qty: 28 CAPSULE | Refills: 0 | Status: SHIPPED | OUTPATIENT
Start: 2024-03-04 | End: 2024-03-11

## 2024-03-04 RX ORDER — METRONIDAZOLE 500 MG/1
500 TABLET ORAL 3 TIMES DAILY
Qty: 21 TABLET | Refills: 0 | Status: SHIPPED | OUTPATIENT
Start: 2024-03-04 | End: 2024-03-11

## 2024-03-04 RX ORDER — ERGOCALCIFEROL 1.25 MG/1
CAPSULE ORAL
Qty: 15 CAPSULE | Refills: 1 | Status: SHIPPED | OUTPATIENT
Start: 2024-03-04

## 2024-03-04 RX ORDER — ONDANSETRON 4 MG/1
4 TABLET, FILM COATED ORAL EVERY 8 HOURS PRN
Qty: 30 TABLET | Refills: 0 | Status: SHIPPED | OUTPATIENT
Start: 2024-03-04

## 2024-03-04 RX ORDER — BUDESONIDE 3 MG/1
6 CAPSULE, COATED PELLETS ORAL EVERY MORNING
Qty: 10 CAPSULE | Refills: 0 | Status: SHIPPED | OUTPATIENT
Start: 2024-03-04 | End: 2024-03-09

## 2024-03-04 NOTE — PROGRESS NOTES
Chief Complaint  Chief Complaint   Patient presents with    Diarrhea    Abdominal Pain     Ate icecream 1 week ago similar symptoms . PT has IBS and symptoms began this morning w/o any dairy product consumption .    Vomiting           Subjective          Romina Camarillo presents to [facility] for   History of Present Illness      Pt with history of IBS and diverticulosis presents today with lower quadrant abdominal pain, cramping, bloating, nausea and vomiting.  She reports having a possible dairy intolerance, she did not have diarrhea yesterday but did have popcorn and corn. She denies blood or mucus in stool, but has had multiple episodes of diarrhea today.      The following portions of the patient's history were reviewed and updated as appropriate: allergies, current medications, past family history, past medical history, past social history, past surgical history and problem list.    Past Medical History:   Diagnosis Date    Anxiety 04/19/2017    Depression     Fatigue 02/06/2019    Lung nodule 01/03/2018    Obesity 04/19/2017    Preventative health care 04/19/2017    Upper respiratory infection 02/06/2019    Visit for screening mammogram 02/06/2019     Past Surgical History:   Procedure Laterality Date    CHOLECYSTECTOMY      TUBAL ABDOMINAL LIGATION       Family History   Problem Relation Age of Onset    Diabetes Maternal Aunt     Diabetes Maternal Uncle      Social History     Tobacco Use    Smoking status: Every Day     Current packs/day: 0.50     Average packs/day: 0.5 packs/day for 26.2 years (13.1 ttl pk-yrs)     Types: Cigarettes     Start date: 1998    Smokeless tobacco: Never   Substance Use Topics    Alcohol use: No       Current Outpatient Medications:     albuterol sulfate  (90 Base) MCG/ACT inhaler, Inhale 2 puffs Every 4 (Four) Hours As Needed for Wheezing or Shortness of Air., Disp: 18 g, Rfl: 5    budesonide-formoterol (Symbicort) 160-4.5 MCG/ACT inhaler,  "Inhale 1 puff 2 (Two) Times a Day., Disp: 10.2 g, Rfl: 5    cetirizine (zyrTEC) 10 MG tablet, Take 1 tablet by mouth every night at bedtime., Disp: 90 tablet, Rfl: 3    levothyroxine (SYNTHROID, LEVOTHROID) 75 MCG tablet, TAKE 1 TABLET BY MOUTH DAILY, Disp: 90 tablet, Rfl: 1    LORazepam (ATIVAN) 0.5 MG tablet, Take 1 tablet by mouth 2 (Two) Times a Day As Needed for Anxiety. for anxiety, Disp: 45 tablet, Rfl: 2    montelukast (Singulair) 10 MG tablet, Take 1 tablet by mouth Every Night., Disp: 90 tablet, Rfl: 1    ondansetron (Zofran) 4 MG tablet, Take 1 tablet by mouth Every 8 (Eight) Hours As Needed for Nausea or Vomiting., Disp: 30 tablet, Rfl: 0    sertraline (ZOLOFT) 100 MG tablet, Take 1 tablet by mouth Daily., Disp: 90 tablet, Rfl: 1    vitamin D (ERGOCALCIFEROL) 1.25 MG (33962 UT) capsule capsule, Take once weekly on mondays, Disp: 15 capsule, Rfl: 1    Budesonide (Entocort EC) 3 MG 24 hr capsule, Take 2 capsules by mouth Every Morning for 5 days., Disp: 10 capsule, Rfl: 0    Cyanocobalamin (B-12) 1000 MCG capsule, Take 1 capsule by mouth Daily. (Patient not taking: Reported on 3/4/2024), Disp: 90 capsule, Rfl: 3    dicyclomine (BENTYL) 10 MG capsule, Take 1 capsule by mouth 4 (Four) Times a Day Before Meals & at Bedtime for 7 days., Disp: 28 capsule, Rfl: 0    metroNIDAZOLE (Flagyl) 500 MG tablet, Take 1 tablet by mouth 3 (Three) Times a Day for 7 days., Disp: 21 tablet, Rfl: 0    Objective   Vital Signs:   /68 (BP Location: Left arm, Patient Position: Sitting, Cuff Size: Large Adult)   Pulse 68   Temp 97.2 °F (36.2 °C) (Oral)   Resp 18   Ht 165.1 cm (65\")   Wt 96.1 kg (211 lb 12.8 oz)   SpO2 96%   BMI 35.25 kg/m²           Physical Exam  Constitutional:       General: She is not in acute distress.     Appearance: Normal appearance. She is well-developed. She is not ill-appearing or diaphoretic.   HENT:      Head: Normocephalic.   Eyes:      Conjunctiva/sclera: Conjunctivae normal.      " Pupils: Pupils are equal, round, and reactive to light.   Neck:      Thyroid: No thyromegaly.      Vascular: No JVD.   Cardiovascular:      Rate and Rhythm: Normal rate and regular rhythm.      Heart sounds: Normal heart sounds. No murmur heard.  Pulmonary:      Effort: Pulmonary effort is normal. No respiratory distress.      Breath sounds: Wheezing (LLL) present. No rhonchi.      Comments: Chronic congested cough  Abdominal:      General: Bowel sounds are normal. There is no distension.      Palpations: Abdomen is soft.      Tenderness: There is abdominal tenderness (BLQ and hyperactive BS x4).   Musculoskeletal:         General: No swelling or tenderness. Normal range of motion.      Cervical back: Normal range of motion and neck supple. No tenderness.   Lymphadenopathy:      Cervical: No cervical adenopathy.   Skin:     General: Skin is warm and dry.      Coloration: Skin is not jaundiced.      Findings: No erythema or rash.   Neurological:      General: No focal deficit present.      Mental Status: She is alert and oriented to person, place, and time. Mental status is at baseline.      Sensory: No sensory deficit.   Psychiatric:         Mood and Affect: Mood normal.         Behavior: Behavior normal.         Thought Content: Thought content normal.         Judgment: Judgment normal.          Result Review :     Office Visit on 03/04/2024   Component Date Value Ref Range Status    Color 03/04/2024 Yellow  Yellow, Straw, Dark Yellow, Cristy Final    Clarity, UA 03/04/2024 Clear  Clear Final    Glucose, UA 03/04/2024 Negative  Negative mg/dL Final    Bilirubin 03/04/2024 Negative  Negative Final    Ketones, UA 03/04/2024 Negative  Negative Final    Specific Gravity  03/04/2024 1.015  1.005 - 1.030 Final    Blood, UA 03/04/2024 Trace (A)  Negative Final    pH, Urine 03/04/2024 7.5  5.0 - 8.0 Final    Protein, POC 03/04/2024 Negative  Negative mg/dL Final    Urobilinogen, UA 03/04/2024 Normal  Normal, 0.2 E.U./dL  Final    Nitrite, UA 03/04/2024 Negative  Negative Final    Leukocytes 03/04/2024 Negative  Negative Final                              Assessment and Plan    Diagnoses and all orders for this visit:    1. Diverticulitis (Primary)  Comments:  eliminate seeds, nuts and kernels from the diet  Start medications as ordered  Push fluids  Orders:  -     metroNIDAZOLE (Flagyl) 500 MG tablet; Take 1 tablet by mouth 3 (Three) Times a Day for 7 days.  Dispense: 21 tablet; Refill: 0  -     Budesonide (Entocort EC) 3 MG 24 hr capsule; Take 2 capsules by mouth Every Morning for 5 days.  Dispense: 10 capsule; Refill: 0  -     dicyclomine (BENTYL) 10 MG capsule; Take 1 capsule by mouth 4 (Four) Times a Day Before Meals & at Bedtime for 7 days.  Dispense: 28 capsule; Refill: 0  -     ondansetron (Zofran) 4 MG tablet; Take 1 tablet by mouth Every 8 (Eight) Hours As Needed for Nausea or Vomiting.  Dispense: 30 tablet; Refill: 0  -     POC Urinalysis Dipstick, Multipro    2. Vitamin D deficiency  Comments:  Continue weekly vitamin D  Orders:  -     vitamin D (ERGOCALCIFEROL) 1.25 MG (43323 UT) capsule capsule; Take once weekly on mondays  Dispense: 15 capsule; Refill: 1    3. Chronic obstructive pulmonary disease, unspecified COPD type  Comments:  increase symbicort to twice daily continue and refill albuterol    Other orders  -     albuterol sulfate  (90 Base) MCG/ACT inhaler; Inhale 2 puffs Every 4 (Four) Hours As Needed for Wheezing or Shortness of Air.  Dispense: 18 g; Refill: 5      Patient missed her 6-month follow-up appointment, will plan to yearly [facility] in April. Labs prior     I spent 30 minutes caring for Romina Camarillo on this date of service. This time includes time spent by me in the following activities: preparing for the visit, reviewing tests, performing a medically appropriate examination and/or evaluation , counseling and educating the patient/family/caregiver, ordering medications, tests, or procedures and  documenting information in the medical record        Follow Up     Return if symptoms worsen or fail to improve in 3-4 days, for Medicare Wellness, Annual physical after 4/12. HTN panel prior to appt .  Patient was given instructions and counseling regarding her condition or for health maintenance advice. Please see specific information pulled into the AVS if appropriate.        Part of this note may be an electronic transcription/translation of spoken language to printed text using the Dragon Dictation System

## 2024-04-29 DIAGNOSIS — F41.9 ANXIETY: ICD-10-CM

## 2024-04-29 DIAGNOSIS — J30.1 SEASONAL ALLERGIC RHINITIS DUE TO POLLEN: ICD-10-CM

## 2024-04-30 RX ORDER — LORAZEPAM 0.5 MG/1
0.5 TABLET ORAL 2 TIMES DAILY PRN
Qty: 45 TABLET | Refills: 2 | Status: SHIPPED | OUTPATIENT
Start: 2024-04-30

## 2024-04-30 RX ORDER — MONTELUKAST SODIUM 10 MG/1
10 TABLET ORAL NIGHTLY
Qty: 90 TABLET | Refills: 2 | Status: SHIPPED | OUTPATIENT
Start: 2024-04-30

## 2024-04-30 RX ORDER — CHOLECALCIFEROL (VITAMIN D3) 1250 MCG
CAPSULE ORAL
Qty: 15 CAPSULE | Refills: 2 | Status: SHIPPED | OUTPATIENT
Start: 2024-04-30

## 2024-05-01 DIAGNOSIS — E03.9 HYPOTHYROIDISM, UNSPECIFIED TYPE: Primary | ICD-10-CM

## 2024-05-01 DIAGNOSIS — E55.9 VITAMIN D DEFICIENCY: ICD-10-CM

## 2024-05-09 ENCOUNTER — LAB (OUTPATIENT)
Dept: FAMILY MEDICINE CLINIC | Facility: CLINIC | Age: 54
End: 2024-05-09
Payer: MEDICARE

## 2024-05-09 PROCEDURE — 80061 LIPID PANEL: CPT | Performed by: NURSE PRACTITIONER

## 2024-05-09 PROCEDURE — 84443 ASSAY THYROID STIM HORMONE: CPT | Performed by: NURSE PRACTITIONER

## 2024-05-09 PROCEDURE — 36415 COLL VENOUS BLD VENIPUNCTURE: CPT | Performed by: NURSE PRACTITIONER

## 2024-05-09 PROCEDURE — 82306 VITAMIN D 25 HYDROXY: CPT | Performed by: NURSE PRACTITIONER

## 2024-05-09 PROCEDURE — 80053 COMPREHEN METABOLIC PANEL: CPT | Performed by: NURSE PRACTITIONER

## 2024-05-09 PROCEDURE — 85027 COMPLETE CBC AUTOMATED: CPT | Performed by: NURSE PRACTITIONER

## 2024-05-10 LAB
25(OH)D3 SERPL-MCNC: 29.4 NG/ML (ref 30–100)
ALBUMIN SERPL-MCNC: 4.1 G/DL (ref 3.5–5.2)
ALBUMIN/GLOB SERPL: 1.5 G/DL
ALP SERPL-CCNC: 81 U/L (ref 39–117)
ALT SERPL W P-5'-P-CCNC: 15 U/L (ref 1–33)
ANION GAP SERPL CALCULATED.3IONS-SCNC: 12 MMOL/L (ref 5–15)
AST SERPL-CCNC: 14 U/L (ref 1–32)
BILIRUB SERPL-MCNC: 0.3 MG/DL (ref 0–1.2)
BUN SERPL-MCNC: 13 MG/DL (ref 6–20)
BUN/CREAT SERPL: 14.1 (ref 7–25)
CALCIUM SPEC-SCNC: 8.9 MG/DL (ref 8.6–10.5)
CHLORIDE SERPL-SCNC: 107 MMOL/L (ref 98–107)
CHOLEST SERPL-MCNC: 199 MG/DL (ref 0–200)
CO2 SERPL-SCNC: 24 MMOL/L (ref 22–29)
CREAT SERPL-MCNC: 0.92 MG/DL (ref 0.57–1)
DEPRECATED RDW RBC AUTO: 44.9 FL (ref 37–54)
EGFRCR SERPLBLD CKD-EPI 2021: 73.7 ML/MIN/1.73
ERYTHROCYTE [DISTWIDTH] IN BLOOD BY AUTOMATED COUNT: 12.7 % (ref 12.3–15.4)
GLOBULIN UR ELPH-MCNC: 2.7 GM/DL
GLUCOSE SERPL-MCNC: 90 MG/DL (ref 65–99)
HCT VFR BLD AUTO: 43.4 % (ref 34–46.6)
HDLC SERPL-MCNC: 53 MG/DL (ref 40–60)
HGB BLD-MCNC: 14.4 G/DL (ref 12–15.9)
LDLC SERPL CALC-MCNC: 128 MG/DL (ref 0–100)
LDLC/HDLC SERPL: 2.38 {RATIO}
MCH RBC QN AUTO: 31.9 PG (ref 26.6–33)
MCHC RBC AUTO-ENTMCNC: 33.2 G/DL (ref 31.5–35.7)
MCV RBC AUTO: 96 FL (ref 79–97)
PLATELET # BLD AUTO: 292 10*3/MM3 (ref 140–450)
PMV BLD AUTO: 10.9 FL (ref 6–12)
POTASSIUM SERPL-SCNC: 4.1 MMOL/L (ref 3.5–5.2)
PROT SERPL-MCNC: 6.8 G/DL (ref 6–8.5)
RBC # BLD AUTO: 4.52 10*6/MM3 (ref 3.77–5.28)
SODIUM SERPL-SCNC: 143 MMOL/L (ref 136–145)
TRIGL SERPL-MCNC: 100 MG/DL (ref 0–150)
TSH SERPL DL<=0.05 MIU/L-ACNC: 8.27 UIU/ML (ref 0.27–4.2)
VLDLC SERPL-MCNC: 18 MG/DL (ref 5–40)
WBC NRBC COR # BLD AUTO: 8.33 10*3/MM3 (ref 3.4–10.8)

## 2024-05-23 ENCOUNTER — OFFICE VISIT (OUTPATIENT)
Dept: FAMILY MEDICINE CLINIC | Facility: CLINIC | Age: 54
End: 2024-05-23
Payer: MEDICARE

## 2024-05-23 VITALS
HEIGHT: 65 IN | BODY MASS INDEX: 36.06 KG/M2 | DIASTOLIC BLOOD PRESSURE: 79 MMHG | WEIGHT: 216.4 LBS | HEART RATE: 62 BPM | TEMPERATURE: 98.4 F | SYSTOLIC BLOOD PRESSURE: 119 MMHG | OXYGEN SATURATION: 95 %

## 2024-05-23 DIAGNOSIS — Z12.31 BREAST CANCER SCREENING BY MAMMOGRAM: ICD-10-CM

## 2024-05-23 DIAGNOSIS — E55.9 VITAMIN D DEFICIENCY: ICD-10-CM

## 2024-05-23 DIAGNOSIS — F41.9 ANXIETY: ICD-10-CM

## 2024-05-23 DIAGNOSIS — J44.9 CHRONIC OBSTRUCTIVE PULMONARY DISEASE, UNSPECIFIED COPD TYPE: ICD-10-CM

## 2024-05-23 DIAGNOSIS — E03.9 ACQUIRED HYPOTHYROIDISM: ICD-10-CM

## 2024-05-23 DIAGNOSIS — Z00.00 MEDICARE ANNUAL WELLNESS VISIT, SUBSEQUENT: Primary | ICD-10-CM

## 2024-05-23 NOTE — PROGRESS NOTES
The ABCs of the Annual Wellness Visit  Subsequent Medicare Wellness Visit    Subjective      Romina Camarillo is a 53 y.o. female who presents for a Subsequent Medicare Wellness Visit and to follow-up on chronic conditions    Patient was seen in March with diarrhea, abdominal pain, cramping, bloating-presumed diverticulitis, symptoms resolved with treatment    Anxiety/insomnia, on Zoloft and Ativan, has been taking her usual dose of sertraline 50 mg, reports anxiety has improved    COPD, asthma, allergy, stable on Singulair and Zyrtec, using Symbicort 2 puffs twice daily     Vitamin D deficiency, on weekly vitamin D     Hypothyroidism, she has missed several doses, reports weight gain. Denies symptoms of constipation, hot or cold intolerance, hair loss, abnormal heart rate and fatigue.     Cologuard and mammogram have been ordered several times but not completed    Here to review labs      The following portions of the patient's history were reviewed and   updated as appropriate: allergies, current medications, past family history, past medical history, past social history, past surgical history, and problem list.    Compared to one year ago, the patient feels her physical   health is the same.    Compared to one year ago, the patient feels her mental   health is the same.    Recent Hospitalizations:  She was not admitted to the hospital during the last year.       Current Medical Providers:  Patient Care Team:  Kyung Ng APRN as PCP - General (Nurse Practitioner)    Outpatient Medications Prior to Visit   Medication Sig Dispense Refill    albuterol sulfate  (90 Base) MCG/ACT inhaler Inhale 2 puffs Every 4 (Four) Hours As Needed for Wheezing or Shortness of Air. 18 g 5    budesonide-formoterol (Symbicort) 160-4.5 MCG/ACT inhaler Inhale 1 puff 2 (Two) Times a Day. 10.2 g 5    cetirizine (zyrTEC) 10 MG tablet Take 1 tablet by mouth every night at bedtime. 90 tablet 3    Cholecalciferol (Vitamin D3) 1.25  "MG (57165 UT) capsule TAKE ONE CAPSULE BY MOUTH ONCE WEEKLY ON MONDAYS 15 capsule 2    Cyanocobalamin (B-12) 1000 MCG capsule Take 1 capsule by mouth Daily. 90 capsule 3    levothyroxine (SYNTHROID, LEVOTHROID) 75 MCG tablet TAKE 1 TABLET BY MOUTH DAILY 90 tablet 1    LORazepam (ATIVAN) 0.5 MG tablet TAKE 1 TABLET BY MOUTH TWICE DAILY AS NEEDED FOR ANXIETY 45 tablet 2    montelukast (SINGULAIR) 10 MG tablet TAKE 1 TABLET BY MOUTH EVERY NIGHT 90 tablet 2    ondansetron (Zofran) 4 MG tablet Take 1 tablet by mouth Every 8 (Eight) Hours As Needed for Nausea or Vomiting. 30 tablet 0    vitamin D (ERGOCALCIFEROL) 1.25 MG (82986 UT) capsule capsule Take once weekly on mondays 15 capsule 1    sertraline (ZOLOFT) 100 MG tablet Take 1 tablet by mouth Daily. 90 tablet 1     No facility-administered medications prior to visit.       No opioid medication identified on active medication list. I have reviewed chart for other potential  high risk medication/s and harmful drug interactions in the elderly.        Aspirin is not on active medication list.  Aspirin use is not indicated based on review of current medical condition/s. Risk of harm outweighs potential benefits.  .    Patient Active Problem List   Diagnosis    Anxiety    Lung nodule     Advance Care Planning   Advance Care Planning     Advance Directive is not on file.  ACP discussion was held with the patient during this visit. Patient does not have an advance directive, information provided.     Objective    Vitals:    05/23/24 0856   BP: 119/79   BP Location: Left arm   Patient Position: Sitting   Cuff Size: Adult   Pulse: 62   Temp: 98.4 °F (36.9 °C)   TempSrc: Temporal   SpO2: 95%   Weight: 98.2 kg (216 lb 6.4 oz)   Height: 165.1 cm (65\")     Estimated body mass index is 36.01 kg/m² as calculated from the following:    Height as of this encounter: 165.1 cm (65\").    Weight as of this encounter: 98.2 kg (216 lb 6.4 oz).    Class 2 Severe Obesity (BMI >=35 and <=39.9). " Obesity-related health conditions include the following: dyslipidemias. Obesity is worsening. BMI is is above average; BMI management plan is completed. We discussed low calorie, low carb based diet program, portion control, and increasing exercise.    Physical Exam  Constitutional:       General: She is not in acute distress.     Appearance: Normal appearance. She is well-developed. She is not ill-appearing or diaphoretic.   HENT:      Head: Normocephalic.   Eyes:      Conjunctiva/sclera: Conjunctivae normal.      Pupils: Pupils are equal, round, and reactive to light.   Neck:      Thyroid: No thyromegaly.      Vascular: No JVD.   Cardiovascular:      Rate and Rhythm: Normal rate and regular rhythm.      Heart sounds: Normal heart sounds. No murmur heard.  Pulmonary:      Effort: Pulmonary effort is normal. No respiratory distress.      Breath sounds: Normal breath sounds. No wheezing or rhonchi.      Comments: Congested cough  Abdominal:      General: Bowel sounds are normal. There is no distension.      Palpations: Abdomen is soft.      Tenderness: There is no abdominal tenderness.   Musculoskeletal:         General: No swelling or tenderness. Normal range of motion.      Cervical back: Normal range of motion and neck supple. No tenderness.   Lymphadenopathy:      Cervical: No cervical adenopathy.   Skin:     General: Skin is warm and dry.      Coloration: Skin is not jaundiced.      Findings: No erythema or rash.   Neurological:      General: No focal deficit present.      Mental Status: She is alert and oriented to person, place, and time. Mental status is at baseline.      Sensory: No sensory deficit.      Motor: No weakness.      Gait: Gait normal.   Psychiatric:         Mood and Affect: Mood normal.         Behavior: Behavior normal.         Thought Content: Thought content normal.         Judgment: Judgment normal.         Does the patient have evidence of cognitive impairment?   No    Lab Results    Component Value Date    TRIG 100 2024    HDL 53 2024     (H) 2024    VLDL 18 2024          HEALTH RISK ASSESSMENT    Smoking Status:  Social History     Tobacco Use   Smoking Status Every Day    Current packs/day: 0.50    Average packs/day: 0.5 packs/day for 26.4 years (13.2 ttl pk-yrs)    Types: Cigarettes    Start date:    Smokeless Tobacco Never     Alcohol Consumption:  Social History     Substance and Sexual Activity   Alcohol Use No     Fall Risk Screen:    STEADI Fall Risk Assessment was completed, and patient is at LOW risk for falls.Assessment completed on:2024    Depression Screenin/23/2024     8:59 AM   PHQ-2/PHQ-9 Depression Screening   Little Interest or Pleasure in Doing Things 0-->not at all   Feeling Down, Depressed or Hopeless 0-->not at all   PHQ-9: Brief Depression Severity Measure Score 0       Health Habits and Functional and Cognitive Screenin/23/2024     9:00 AM   Functional & Cognitive Status   Do you have difficulty preparing food and eating? No   Do you have difficulty bathing yourself, getting dressed or grooming yourself? No   Do you have difficulty using the toilet? No   Do you have difficulty moving around from place to place? No   Do you have trouble with steps or getting out of a bed or a chair? No   Current Diet Well Balanced Diet   Dental Exam Up to date   Eye Exam Not up to date   Exercise (times per week) 0 times per week   Current Exercises Include House Cleaning;Yard Work;Walking   Do you need help using the phone?  No   Are you deaf or do you have serious difficulty hearing?  No   Do you need help to go to places out of walking distance? No   Do you need help shopping? No   Do you need help preparing meals?  No   Do you need help with housework?  No   Do you need help with laundry? No   Do you need help taking your medications? No   Do you need help managing money? No   Do you ever drive or ride in a car without  wearing a seat belt? No   Have you felt unusual stress, anger or loneliness in the last month? No   Who do you live with? Child   If you need help, do you have trouble finding someone available to you? No   Have you been bothered in the last four weeks by sexual problems? No   Do you have difficulty concentrating, remembering or making decisions? Yes     ATTENTION  What is the year: correct  What is the month of the year: correct  What is the day of the week?: correct  What is the date?: correct  MEMORY  Repeat address three times, only score third attempt: [address], [place]: 3  HOW MANY ANIMALS DID THE PATIENT NAME  Verbal Fluency -- Animal Names (0-25): 9-10  CLOCK DRAWING  Clock Drawing: All Correct  MEMORY RECALL  Tell me what you remember about that name and address we were repeating at the beginnin  ACE TOTAL SCORE  Total ACE Score - <25/30 strongly suggests cognitive impairment; <21/30 almost certainly shows dementia: 15      Age-appropriate Screening Schedule:  Refer to the list below for future screening recommendations based on patient's age, sex and/or medical conditions. Orders for these recommended tests are listed in the plan section. The patient has been provided with a written plan.    Health Maintenance   Topic Date Due    MAMMOGRAM  Never done    COLORECTAL CANCER SCREENING  Never done    PAP SMEAR  2023    ANNUAL WELLNESS VISIT  2024    Pneumococcal Vaccine 0-64 (1 of 2 - PCV) 2029 (Originally 1976)    LIPID PANEL  2025    BMI FOLLOWUP  2025    HEPATITIS C SCREENING  Completed    COVID-19 Vaccine  Discontinued    INFLUENZA VACCINE  Discontinued    TDAP/TD VACCINES  Discontinued    ZOSTER VACCINE  Discontinued                  CMS Preventative Services Quick Reference  Risk Factors Identified During Encounter:    Inactivity/Sedentary: Patient was advised to exercise at least 150 minutes a week per CDC recommendations.    The  above risks/problems have been discussed with the patient.  Pertinent information has been shared with the patient in the After Visit Summary.    Diagnoses and all orders for this visit:    1. Medicare annual wellness visit, subsequent (Primary)    2. Vitamin D deficiency    3. Breast cancer screening by mammogram  -     Mammo Screening Digital Tomosynthesis Bilateral With CAD; Future    4. Anxiety  Comments:  stable, reduce zoloft back to 50  ok to continue Lorazepam q hs and occasionally BID PRN, may take extra 1/2 tab if awakens through the night.  Orders:  -     sertraline (ZOLOFT) 50 MG tablet; Take 1 tablet by mouth Daily.  Dispense: 90 tablet; Refill: 1    5. Acquired hypothyroidism    6. Chronic obstructive pulmonary disease, unspecified COPD type      Conditions stable  Continue current medication regimen  Labs reviewed and mostly stable, recommend take vitamin D weekly and do not miss doses of levothyroxine  Decrease Zoloft back to 50 mg tablets  Patient agrees to call and schedule mammogram   Patient will call and check with Cologuard if kit is , states will complete it.   Age appropriate preventative counseling provided, including healthy lifestyle modifications and exercise      Follow Up:     Return in about 6 months (around 2024) for Recheck. HTN panel prior to appt .    Next Medicare Wellness visit to be scheduled in 1 year.      An After Visit Summary and PPPS were made available to the patient.    EMR Dragon transcription disclaimer:  Part of this note may be an electronic transcription/translation of spoken language to printed text using the Dragon Dictation System.

## 2024-06-25 ENCOUNTER — TELEPHONE (OUTPATIENT)
Dept: FAMILY MEDICINE CLINIC | Facility: CLINIC | Age: 54
End: 2024-06-25
Payer: MEDICARE

## 2024-08-05 DIAGNOSIS — E03.9 HYPOTHYROIDISM, UNSPECIFIED TYPE: ICD-10-CM

## 2024-08-05 RX ORDER — LEVOTHYROXINE SODIUM 0.07 MG/1
75 TABLET ORAL DAILY
Qty: 90 TABLET | Refills: 1 | Status: SHIPPED | OUTPATIENT
Start: 2024-08-05

## 2024-08-27 ENCOUNTER — TELEPHONE (OUTPATIENT)
Dept: FAMILY MEDICINE CLINIC | Facility: CLINIC | Age: 54
End: 2024-08-27
Payer: MEDICARE

## 2024-08-27 RX ORDER — PREDNISONE 10 MG/1
TABLET ORAL
Qty: 33 TABLET | Refills: 0 | Status: SHIPPED | OUTPATIENT
Start: 2024-08-27

## 2024-08-27 NOTE — TELEPHONE ENCOUNTER
Caller: Romina Camarillo    Relationship: Self    Best call back number: [phone]     What medication are you requesting: ANTIBIOTIC    What are your current symptoms: SNOTTY NOSE, HEADACHE, SCRATCHY THROAT, EARS FEEL CLOGGED, COUGH, GREEN MUCUS, CONGESTION, DOES NOT FEEL WELL    How long have you been experiencing symptoms: 2 WEEKS    Have you had these symptoms before:    [] Yes  [x] No    Have you been treated for these symptoms before:   [] Yes  [x] No    If a prescription is needed, what is your preferred pharmacy and phone number: [facility] #[id number] [place], [place] - [address]  AT [facility] OF [address] - [phone] [facility] [phone]      Additional notes: PATIENT STATED SHE HAS BEEN EXPERIENCING SYMPTOMS FOR TWO WEEKS, AND HAS NOT BEEN GETTING BETTER.    PATIENT STATED SHE HAS TAKEN AT HOME COVID TESTS, WHICH HAVE COME BACK NEGATIVE.    PATIENT STATED SHE IS UNABLE TO COME INTO THE OFFICE DUE TO TRANSPORTATION ISSUES, AND IS REQUESTING TO KNOW IF A MEDICATION MAY B PRESCRIBED BY RYAN WADE FOR THIS, SUCH ADS AN ANTIBIOTIC.

## 2024-09-10 DIAGNOSIS — F41.9 ANXIETY: ICD-10-CM

## 2024-09-10 RX ORDER — LORAZEPAM 0.5 MG/1
0.5 TABLET ORAL 2 TIMES DAILY PRN
Qty: 45 TABLET | Refills: 2 | Status: SHIPPED | OUTPATIENT
Start: 2024-09-10

## 2024-11-07 DIAGNOSIS — F41.9 ANXIETY: ICD-10-CM

## 2024-11-07 DIAGNOSIS — J44.9 CHRONIC OBSTRUCTIVE PULMONARY DISEASE, UNSPECIFIED COPD TYPE: ICD-10-CM

## 2024-11-07 NOTE — TELEPHONE ENCOUNTER
PATIENT IS IN THE PROCESS OF GETTING HER INSURANCE RE-INSTATED, AND WILL CALL TO MAKE AN APPT ONCE IT'S DONE     BUT SHE DOES NEED REFILLS       Caller: Romina Camarillo    Relationship: Self    Best call back number:     [phone] (Home)       Requested Prescriptions:   Requested Prescriptions     Pending Prescriptions Disp Refills    LORazepam (ATIVAN) 0.5 MG tablet 45 tablet 2     Sig: Take 1 tablet by mouth 2 (Two) Times a Day As Needed. for anxiety    budesonide-formoterol (Symbicort) 160-4.5 MCG/ACT inhaler 10.2 g 5     Sig: Inhale 1 puff 2 (Two) Times a Day.        Pharmacy where request should be sent: [facility] #[id number] [place] [address] [address] AT [address] [phone] [facility] [phone]      Last office visit with prescribing clinician: 5/23/2024   Last telemedicine visit with prescribing clinician: Visit date not found   Next office visit with prescribing clinician: Visit date not found     Additional details provided by patient:     Does the patient have less than a 3 day supply:  [x] Yes  [] No    Would you like a call back once the refill request has been completed: [] Yes [] No    If the office needs to give you a call back, can they leave a voicemail: [] Yes [] No    Mich Vega Rep   11/07/24 12:53 EST

## 2024-11-08 RX ORDER — LORAZEPAM 0.5 MG/1
0.5 TABLET ORAL 2 TIMES DAILY PRN
Qty: 45 TABLET | Refills: 2 | Status: SHIPPED | OUTPATIENT
Start: 2024-11-08

## 2024-11-08 RX ORDER — BUDESONIDE AND FORMOTEROL FUMARATE DIHYDRATE 160; 4.5 UG/1; UG/1
1 AEROSOL RESPIRATORY (INHALATION) 2 TIMES DAILY
Qty: 10.2 G | Refills: 5 | Status: SHIPPED | OUTPATIENT
Start: 2024-11-08

## 2025-02-24 RX ORDER — ALBUTEROL SULFATE 90 UG/1
2 INHALANT RESPIRATORY (INHALATION) EVERY 4 HOURS PRN
Qty: 17 G | Refills: 1 | Status: SHIPPED | OUTPATIENT
Start: 2025-02-24

## 2025-02-27 ENCOUNTER — OFFICE VISIT (OUTPATIENT)
Dept: FAMILY MEDICINE CLINIC | Facility: CLINIC | Age: 55
End: 2025-02-27
Payer: MEDICARE

## 2025-02-27 VITALS
DIASTOLIC BLOOD PRESSURE: 78 MMHG | BODY MASS INDEX: 36.49 KG/M2 | OXYGEN SATURATION: 95 % | HEIGHT: 65 IN | HEART RATE: 63 BPM | SYSTOLIC BLOOD PRESSURE: 113 MMHG | WEIGHT: 219 LBS

## 2025-02-27 DIAGNOSIS — E03.9 ACQUIRED HYPOTHYROIDISM: ICD-10-CM

## 2025-02-27 DIAGNOSIS — J30.1 SEASONAL ALLERGIC RHINITIS DUE TO POLLEN: ICD-10-CM

## 2025-02-27 DIAGNOSIS — E55.9 VITAMIN D DEFICIENCY: ICD-10-CM

## 2025-02-27 DIAGNOSIS — Z00.00 PREVENTATIVE HEALTH CARE: Primary | ICD-10-CM

## 2025-02-27 DIAGNOSIS — J44.1 CHRONIC OBSTRUCTIVE PULMONARY DISEASE WITH ACUTE EXACERBATION: ICD-10-CM

## 2025-02-27 DIAGNOSIS — F41.9 ANXIETY: ICD-10-CM

## 2025-02-27 DIAGNOSIS — E53.8 B12 DEFICIENCY: ICD-10-CM

## 2025-02-27 RX ORDER — PREDNISONE 10 MG/1
TABLET ORAL
Qty: 33 TABLET | Refills: 0 | Status: SHIPPED | OUTPATIENT
Start: 2025-02-27

## 2025-02-27 RX ORDER — MONTELUKAST SODIUM 10 MG/1
10 TABLET ORAL NIGHTLY
Qty: 90 TABLET | Refills: 3 | Status: SHIPPED | OUTPATIENT
Start: 2025-02-27

## 2025-02-27 RX ORDER — ERGOCALCIFEROL 1.25 MG/1
CAPSULE, LIQUID FILLED ORAL
Qty: 15 CAPSULE | Refills: 3 | Status: SHIPPED | OUTPATIENT
Start: 2025-02-27

## 2025-02-27 RX ORDER — CETIRIZINE HYDROCHLORIDE 10 MG/1
10 TABLET ORAL
Qty: 90 TABLET | Refills: 3 | Status: SHIPPED | OUTPATIENT
Start: 2025-02-27

## 2025-02-27 RX ORDER — LEVOTHYROXINE SODIUM 75 UG/1
75 TABLET ORAL DAILY
Qty: 90 TABLET | Refills: 1 | Status: SHIPPED | OUTPATIENT
Start: 2025-02-27

## 2025-02-27 NOTE — PROGRESS NOTES
Chief Complaint  Chief Complaint   Patient presents with    Follow-up     6 month follow up HTN     Cough     For 3 weeks            Subjective          Romina Camarillo presents to [facility] for   History of Present Illness    Anxiety/insomnia, on Zoloft and Ativan, has been taking her usual dose of sertraline 50 mg, reports anxiety is stable     COPD, asthma, allergy, she c/o a cough with deep rattle, productive yellow sputum for 3 weeks, has been stable on Singulair and Zyrtec, using Symbicort 1 puff once/day.      Vitamin D deficiency, on weekly vitamin D     Hypothyroidism, she is taking levothyroxine daily as directed.  Denies symptoms of constipation, hot or cold intolerance, hair loss, abnormal heart rate and fatigue.      Cologuard and mammogram have been ordered several times but not completed-patient declines     Here to review labs      The following portions of the patient's history were reviewed and updated as appropriate: allergies, current medications, past family history, past medical history, past social history, past surgical history and problem list.    Past Medical History:   Diagnosis Date    Anxiety 04/19/2017    Depression     Fatigue 02/06/2019    Lung nodule 01/03/2018    Obesity 04/19/2017    Preventative health care 04/19/2017    Upper respiratory infection 02/06/2019    Visit for screening mammogram 02/06/2019     Past Surgical History:   Procedure Laterality Date    CHOLECYSTECTOMY      TUBAL ABDOMINAL LIGATION       Family History   Problem Relation Age of Onset    Diabetes Maternal Aunt     Diabetes Maternal Uncle      Social History     Tobacco Use    Smoking status: Every Day     Current packs/day: 0.50     Average packs/day: 0.5 packs/day for 27.2 years (13.6 ttl pk-yrs)     Types: Cigarettes     Start date: 1998     Passive exposure: Current    Smokeless tobacco: Never   Substance Use Topics    Alcohol use: No       Current Outpatient Medications:  "    albuterol sulfate  (90 Base) MCG/ACT inhaler, INHALE 2 PUFFS BY MOUTH EVERY 4 HOURS AS NEEDED FOR WHEEZING OR SHORTNESS OF AIR, Disp: 17 g, Rfl: 1    amoxicillin-clavulanate (AUGMENTIN) 875-125 MG per tablet, Take 1 tablet by mouth 2 (Two) Times a Day., Disp: 20 tablet, Rfl: 0    budesonide-formoterol (Symbicort) 160-4.5 MCG/ACT inhaler, Inhale 1 puff 2 (Two) Times a Day., Disp: 10.2 g, Rfl: 5    cetirizine (zyrTEC) 10 MG tablet, Take 1 tablet by mouth every night at bedtime., Disp: 90 tablet, Rfl: 3    Cholecalciferol (Vitamin D3) 1.25 MG (22878 UT) capsule, TAKE ONE CAPSULE BY MOUTH ONCE WEEKLY ON MONDAYS, Disp: 15 capsule, Rfl: 2    Cyanocobalamin (B-12) 1000 MCG capsule, Take 1 capsule by mouth Daily., Disp: 90 capsule, Rfl: 3    levothyroxine (SYNTHROID, LEVOTHROID) 75 MCG tablet, Take 1 tablet by mouth Daily., Disp: 90 tablet, Rfl: 1    LORazepam (ATIVAN) 0.5 MG tablet, Take 1 tablet by mouth 2 (Two) Times a Day As Needed for Anxiety. for anxiety, Disp: 45 tablet, Rfl: 2    montelukast (SINGULAIR) 10 MG tablet, Take 1 tablet by mouth Every Night., Disp: 90 tablet, Rfl: 3    ondansetron (Zofran) 4 MG tablet, Take 1 tablet by mouth Every 8 (Eight) Hours As Needed for Nausea or Vomiting., Disp: 30 tablet, Rfl: 0    predniSONE (DELTASONE) 10 MG tablet, Take 5 po for 2 days, Take 4 for 2 days, then 3 for 2 days, then 2 for 2 days, then 1 for 5 days, then stop, Disp: 33 tablet, Rfl: 0    sertraline (ZOLOFT) 50 MG tablet, Take 1 tablet by mouth Daily., Disp: 90 tablet, Rfl: 3    vitamin D (ERGOCALCIFEROL) 1.25 MG (21308 UT) capsule capsule, Take once weekly on mondays, Disp: 15 capsule, Rfl: 3    Objective   Vital Signs:   Vitals:    02/27/25 1609   BP: 113/78   BP Location: Left arm   Patient Position: Sitting   Cuff Size: Adult   Pulse: 63   SpO2: 95%   Weight: 99.3 kg (219 lb)   Height: 165.1 cm (65\")   PainSc: 0-No pain       Body mass index is 36.44 kg/m².    Physical Exam  Constitutional:       " General: She is not in acute distress.     Appearance: Normal appearance. She is well-developed. She is not ill-appearing or diaphoretic.   HENT:      Head: Normocephalic.   Eyes:      Conjunctiva/sclera: Conjunctivae normal.      Pupils: Pupils are equal, round, and reactive to light.   Neck:      Thyroid: No thyromegaly.      Vascular: No JVD.   Cardiovascular:      Rate and Rhythm: Normal rate and regular rhythm.      Heart sounds: Normal heart sounds. No murmur heard.  Pulmonary:      Effort: Pulmonary effort is normal. No respiratory distress.      Breath sounds: Wheezing and rhonchi present.   Abdominal:      General: Bowel sounds are normal. There is no distension.      Palpations: Abdomen is soft.      Tenderness: There is no abdominal tenderness.   Musculoskeletal:         General: No swelling or tenderness. Normal range of motion.      Cervical back: Normal range of motion and neck supple. No tenderness.   Lymphadenopathy:      Cervical: No cervical adenopathy.   Skin:     General: Skin is warm and dry.      Coloration: Skin is not jaundiced.      Findings: No erythema or rash.   Neurological:      General: No focal deficit present.      Mental Status: She is alert and oriented to person, place, and time. Mental status is at baseline.      Sensory: No sensory deficit.      Motor: No weakness.      Gait: Gait normal.   Psychiatric:         Mood and Affect: Mood normal.         Behavior: Behavior normal.         Thought Content: Thought content normal.         Judgment: Judgment normal.          Result Review :     No visits with results within 7 Day(s) from this visit.   Latest known visit with results is:   Orders Only on 05/01/2024   Component Date Value Ref Range Status    WBC 05/09/2024 8.33  3.40 - 10.80 10*3/mm3 Final    RBC 05/09/2024 4.52  3.77 - 5.28 10*6/mm3 Final    Hemoglobin 05/09/2024 14.4  12.0 - 15.9 g/dL Final    Hematocrit 05/09/2024 43.4  34.0 - 46.6 % Final    MCV 05/09/2024 96.0  79.0 -  97.0 fL Final    MCH 05/09/2024 31.9  26.6 - 33.0 pg Final    MCHC 05/09/2024 33.2  31.5 - 35.7 g/dL Final    RDW 05/09/2024 12.7  12.3 - 15.4 % Final    RDW-SD 05/09/2024 44.9  37.0 - 54.0 fl Final    MPV 05/09/2024 10.9  6.0 - 12.0 fL Final    Platelets 05/09/2024 292  140 - 450 10*3/mm3 Final    Glucose 05/09/2024 90  65 - 99 mg/dL Final    BUN 05/09/2024 13  6 - 20 mg/dL Final    Creatinine 05/09/2024 0.92  0.57 - 1.00 mg/dL Final    Sodium 05/09/2024 143  136 - 145 mmol/L Final    Potassium 05/09/2024 4.1  3.5 - 5.2 mmol/L Final    Chloride 05/09/2024 107  98 - 107 mmol/L Final    CO2 05/09/2024 24.0  22.0 - 29.0 mmol/L Final    Calcium 05/09/2024 8.9  8.6 - 10.5 mg/dL Final    Total Protein 05/09/2024 6.8  6.0 - 8.5 g/dL Final    Albumin 05/09/2024 4.1  3.5 - 5.2 g/dL Final    ALT (SGPT) 05/09/2024 15  1 - 33 U/L Final    AST (SGOT) 05/09/2024 14  1 - 32 U/L Final    Alkaline Phosphatase 05/09/2024 81  39 - 117 U/L Final    Total Bilirubin 05/09/2024 0.3  0.0 - 1.2 mg/dL Final    Globulin 05/09/2024 2.7  gm/dL Final    A/G Ratio 05/09/2024 1.5  g/dL Final    BUN/Creatinine Ratio 05/09/2024 14.1  7.0 - 25.0 Final    Anion Gap 05/09/2024 12.0  5.0 - 15.0 mmol/L Final    eGFR 05/09/2024 73.7  >60.0 mL/min/1.73 Final    Total Cholesterol 05/09/2024 199  0 - 200 mg/dL Final    Triglycerides 05/09/2024 100  0 - 150 mg/dL Final    HDL Cholesterol 05/09/2024 53  40 - 60 mg/dL Final    LDL Cholesterol  05/09/2024 128 (H)  0 - 100 mg/dL Final    VLDL Cholesterol 05/09/2024 18  5 - 40 mg/dL Final    LDL/HDL Ratio 05/09/2024 2.38   Final    TSH 05/09/2024 8.270 (H)  0.270 - 4.200 uIU/mL Final    25 Hydroxy, Vitamin D 05/09/2024 29.4 (L)  30.0 - 100.0 ng/ml Final                              Assessment and Plan    Diagnoses and all orders for this visit:    1. Preventative health care (Primary)  Comments:  Patient to return to office next week for labs as ordered  Declines mammogram and Cologuard  Orders:  -     Lipid  Panel; Future  -     Comprehensive Metabolic Panel; Future  -     CBC (No Diff); Future  -     TSH; Future  -     Vitamin D,25-Hydroxy; Future  -     Vitamin B12; Future    2. Seasonal allergic rhinitis due to pollen  Comments:  Continue Zyrtec and Singulair for COPD and allergies  Orders:  -     cetirizine (zyrTEC) 10 MG tablet; Take 1 tablet by mouth every night at bedtime.  Dispense: 90 tablet; Refill: 3  -     montelukast (SINGULAIR) 10 MG tablet; Take 1 tablet by mouth Every Night.  Dispense: 90 tablet; Refill: 3    3. B12 deficiency  Comments:  Check B12 level  Orders:  -     Cyanocobalamin (B-12) 1000 MCG capsule; Take 1 capsule by mouth Daily.  Dispense: 90 capsule; Refill: 3  -     Vitamin B12; Future    4. Acquired hypothyroidism  Comments:  recheck TSH next week. cont/refill levothyroxine, will adjust if needed  Orders:  -     levothyroxine (SYNTHROID, LEVOTHROID) 75 MCG tablet; Take 1 tablet by mouth Daily.  Dispense: 90 tablet; Refill: 1  -     Lipid Panel; Future  -     Comprehensive Metabolic Panel; Future  -     CBC (No Diff); Future  -     TSH; Future    5. Anxiety  Comments:  stable, reduce zoloft back to 50  ok to continue Lorazepam q hs and occasionally BID PRN, may take extra 1/2 tab if awakens through the night.  Orders:  -     sertraline (ZOLOFT) 50 MG tablet; Take 1 tablet by mouth Daily.  Dispense: 90 tablet; Refill: 3    6. Vitamin D deficiency  Comments:  Continue weekly vitamin D  check level  Orders:  -     vitamin D (ERGOCALCIFEROL) 1.25 MG (21493 UT) capsule capsule; Take once weekly on mondays  Dispense: 15 capsule; Refill: 3  -     Vitamin D,25-Hydroxy; Future    7. Chronic obstructive pulmonary disease with acute exacerbation  Comments:  Start using Symbicort 2 puffs twice daily  Continue albuterol as needed  Continue Singulair  Start Augmentin and prednisone    Other orders  -     amoxicillin-clavulanate (AUGMENTIN) 875-125 MG per tablet; Take 1 tablet by mouth 2 (Two) Times a Day.   Dispense: 20 tablet; Refill: 0  -     predniSONE (DELTASONE) 10 MG tablet; Take 5 po for 2 days, Take 4 for 2 days, then 3 for 2 days, then 2 for 2 days, then 1 for 5 days, then stop  Dispense: 33 tablet; Refill: 0        I spent 30 minutes caring for Romina Camarillo on this date of service. This time includes time spent by me in the following activities: preparing for the visit, reviewing tests, performing a medically appropriate examination and/or evaluation , counseling and educating the patient/family/caregiver, ordering medications, tests, or procedures and documenting information in the medical record        Follow Up     Return in about 6 months (around 8/27/2025) for Recheck, [facility]. return to office one am next week for labs .  Patient was given instructions and counseling regarding her condition or for health maintenance advice. Please see specific information pulled into the AVS if appropriate.        Part of this note may be an electronic transcription/translation of spoken language to printed text using the Dragon Dictation System

## 2025-03-25 ENCOUNTER — PATIENT MESSAGE (OUTPATIENT)
Dept: FAMILY MEDICINE CLINIC | Facility: CLINIC | Age: 55
End: 2025-03-25
Payer: MEDICARE

## 2025-04-10 DIAGNOSIS — F41.9 ANXIETY: ICD-10-CM

## 2025-04-10 DIAGNOSIS — E53.8 B12 DEFICIENCY: ICD-10-CM

## 2025-04-10 RX ORDER — LANOLIN ALCOHOL/MO/W.PET/CERES
1000 CREAM (GRAM) TOPICAL DAILY
Qty: 100 TABLET | Refills: 2 | Status: SHIPPED | OUTPATIENT
Start: 2025-04-10

## 2025-04-10 RX ORDER — LORAZEPAM 0.5 MG/1
0.5 TABLET ORAL 2 TIMES DAILY PRN
Qty: 45 TABLET | Refills: 2 | Status: SHIPPED | OUTPATIENT
Start: 2025-04-10

## 2025-07-17 RX ORDER — ALBUTEROL SULFATE 90 UG/1
2 INHALANT RESPIRATORY (INHALATION) EVERY 4 HOURS PRN
Qty: 17 G | Refills: 1 | Status: SHIPPED | OUTPATIENT
Start: 2025-07-17

## 2025-08-11 DIAGNOSIS — F41.9 ANXIETY: ICD-10-CM

## 2025-08-11 DIAGNOSIS — E03.9 ACQUIRED HYPOTHYROIDISM: ICD-10-CM

## 2025-08-11 RX ORDER — LEVOTHYROXINE SODIUM 75 UG/1
75 TABLET ORAL DAILY
Qty: 90 TABLET | Refills: 1 | Status: SHIPPED | OUTPATIENT
Start: 2025-08-11